# Patient Record
Sex: FEMALE | Race: BLACK OR AFRICAN AMERICAN | NOT HISPANIC OR LATINO | Employment: UNEMPLOYED | URBAN - METROPOLITAN AREA
[De-identification: names, ages, dates, MRNs, and addresses within clinical notes are randomized per-mention and may not be internally consistent; named-entity substitution may affect disease eponyms.]

---

## 2019-12-28 ENCOUNTER — HOSPITAL ENCOUNTER (INPATIENT)
Facility: HOSPITAL | Age: 54
LOS: 5 days | Discharge: HOME | DRG: 418 | End: 2020-01-02
Attending: EMERGENCY MEDICINE | Admitting: SURGERY
Payer: MEDICARE

## 2019-12-28 ENCOUNTER — APPOINTMENT (EMERGENCY)
Dept: RADIOLOGY | Facility: HOSPITAL | Age: 54
DRG: 418 | End: 2019-12-28
Payer: MEDICARE

## 2019-12-28 DIAGNOSIS — K85.10 ACUTE GALLSTONE PANCREATITIS: Primary | ICD-10-CM

## 2019-12-28 DIAGNOSIS — K80.00 CHOLECYSTITIS, ACUTE WITH CHOLELITHIASIS: ICD-10-CM

## 2019-12-28 LAB
ALBUMIN SERPL BCP-MCNC: 3.8 G/DL (ref 3.5–5)
ALP SERPL-CCNC: 88 U/L (ref 46–116)
ALT SERPL W P-5'-P-CCNC: 22 U/L (ref 12–78)
ANION GAP SERPL CALCULATED.3IONS-SCNC: 4 MMOL/L (ref 4–13)
AST SERPL W P-5'-P-CCNC: 18 U/L (ref 5–45)
BASOPHILS # BLD AUTO: 0.02 THOUSANDS/ΜL (ref 0–0.1)
BASOPHILS NFR BLD AUTO: 0 % (ref 0–1)
BILIRUB SERPL-MCNC: 0.23 MG/DL (ref 0.2–1)
BUN SERPL-MCNC: 16 MG/DL (ref 5–25)
CALCIUM SERPL-MCNC: 10 MG/DL (ref 8.3–10.1)
CHLORIDE SERPL-SCNC: 102 MMOL/L (ref 100–108)
CO2 SERPL-SCNC: 29 MMOL/L (ref 21–32)
CREAT SERPL-MCNC: 1.32 MG/DL (ref 0.6–1.3)
EOSINOPHIL # BLD AUTO: 0 THOUSAND/ΜL (ref 0–0.61)
EOSINOPHIL NFR BLD AUTO: 0 % (ref 0–6)
ERYTHROCYTE [DISTWIDTH] IN BLOOD BY AUTOMATED COUNT: 13.6 % (ref 11.6–15.1)
GFR SERPL CREATININE-BSD FRML MDRD: 52 ML/MIN/1.73SQ M
GLUCOSE SERPL-MCNC: 159 MG/DL (ref 65–140)
HCT VFR BLD AUTO: 42.4 % (ref 34.8–46.1)
HGB BLD-MCNC: 13.8 G/DL (ref 11.5–15.4)
IMM GRANULOCYTES # BLD AUTO: 0.05 THOUSAND/UL (ref 0–0.2)
IMM GRANULOCYTES NFR BLD AUTO: 0 % (ref 0–2)
LACTATE SERPL-SCNC: 2.1 MMOL/L (ref 0.5–2)
LIPASE SERPL-CCNC: 2308 U/L (ref 73–393)
LYMPHOCYTES # BLD AUTO: 1.32 THOUSANDS/ΜL (ref 0.6–4.47)
LYMPHOCYTES NFR BLD AUTO: 11 % (ref 14–44)
MCH RBC QN AUTO: 30.7 PG (ref 26.8–34.3)
MCHC RBC AUTO-ENTMCNC: 32.5 G/DL (ref 31.4–37.4)
MCV RBC AUTO: 94 FL (ref 82–98)
MONOCYTES # BLD AUTO: 0.97 THOUSAND/ΜL (ref 0.17–1.22)
MONOCYTES NFR BLD AUTO: 8 % (ref 4–12)
NEUTROPHILS # BLD AUTO: 10.07 THOUSANDS/ΜL (ref 1.85–7.62)
NEUTS SEG NFR BLD AUTO: 81 % (ref 43–75)
NRBC BLD AUTO-RTO: 0 /100 WBCS
PLATELET # BLD AUTO: 208 THOUSANDS/UL (ref 149–390)
PMV BLD AUTO: 10.9 FL (ref 8.9–12.7)
POTASSIUM SERPL-SCNC: 4.8 MMOL/L (ref 3.5–5.3)
PROT SERPL-MCNC: 7.9 G/DL (ref 6.4–8.2)
RBC # BLD AUTO: 4.5 MILLION/UL (ref 3.81–5.12)
SODIUM SERPL-SCNC: 135 MMOL/L (ref 136–145)
WBC # BLD AUTO: 12.43 THOUSAND/UL (ref 4.31–10.16)

## 2019-12-28 PROCEDURE — 99222 1ST HOSP IP/OBS MODERATE 55: CPT | Performed by: SURGERY

## 2019-12-28 PROCEDURE — 93005 ELECTROCARDIOGRAM TRACING: CPT

## 2019-12-28 PROCEDURE — 96361 HYDRATE IV INFUSION ADD-ON: CPT

## 2019-12-28 PROCEDURE — 80053 COMPREHEN METABOLIC PANEL: CPT | Performed by: EMERGENCY MEDICINE

## 2019-12-28 PROCEDURE — 36415 COLL VENOUS BLD VENIPUNCTURE: CPT | Performed by: EMERGENCY MEDICINE

## 2019-12-28 PROCEDURE — 83605 ASSAY OF LACTIC ACID: CPT | Performed by: EMERGENCY MEDICINE

## 2019-12-28 PROCEDURE — 99285 EMERGENCY DEPT VISIT HI MDM: CPT

## 2019-12-28 PROCEDURE — 76705 ECHO EXAM OF ABDOMEN: CPT

## 2019-12-28 PROCEDURE — 96375 TX/PRO/DX INJ NEW DRUG ADDON: CPT

## 2019-12-28 PROCEDURE — 85025 COMPLETE CBC W/AUTO DIFF WBC: CPT | Performed by: EMERGENCY MEDICINE

## 2019-12-28 PROCEDURE — 83690 ASSAY OF LIPASE: CPT | Performed by: EMERGENCY MEDICINE

## 2019-12-28 PROCEDURE — 96374 THER/PROPH/DIAG INJ IV PUSH: CPT

## 2019-12-28 PROCEDURE — 99284 EMERGENCY DEPT VISIT MOD MDM: CPT | Performed by: EMERGENCY MEDICINE

## 2019-12-28 RX ORDER — DIVALPROEX SODIUM 125 MG/1
1000 CAPSULE, COATED PELLETS ORAL
Status: DISCONTINUED | OUTPATIENT
Start: 2019-12-29 | End: 2019-12-29

## 2019-12-28 RX ORDER — QUETIAPINE FUMARATE 100 MG/1
100 TABLET, FILM COATED ORAL
Status: DISCONTINUED | OUTPATIENT
Start: 2019-12-28 | End: 2020-01-02 | Stop reason: HOSPADM

## 2019-12-28 RX ORDER — DIVALPROEX SODIUM 125 MG/1
500 CAPSULE, COATED PELLETS ORAL
Status: DISCONTINUED | OUTPATIENT
Start: 2019-12-28 | End: 2019-12-28

## 2019-12-28 RX ORDER — OXYCODONE HYDROCHLORIDE 10 MG/1
10 TABLET ORAL EVERY 4 HOURS PRN
Status: DISCONTINUED | OUTPATIENT
Start: 2019-12-28 | End: 2020-01-02 | Stop reason: HOSPADM

## 2019-12-28 RX ORDER — ONDANSETRON 2 MG/ML
4 INJECTION INTRAMUSCULAR; INTRAVENOUS EVERY 6 HOURS PRN
Status: DISCONTINUED | OUTPATIENT
Start: 2019-12-28 | End: 2020-01-02 | Stop reason: HOSPADM

## 2019-12-28 RX ORDER — QUETIAPINE FUMARATE 100 MG/1
100 TABLET, FILM COATED ORAL
COMMUNITY

## 2019-12-28 RX ORDER — KETOROLAC TROMETHAMINE 30 MG/ML
15 INJECTION, SOLUTION INTRAMUSCULAR; INTRAVENOUS ONCE
Status: COMPLETED | OUTPATIENT
Start: 2019-12-28 | End: 2019-12-28

## 2019-12-28 RX ORDER — DIVALPROEX SODIUM 125 MG/1
500 CAPSULE, COATED PELLETS ORAL DAILY
Status: DISCONTINUED | OUTPATIENT
Start: 2019-12-29 | End: 2019-12-29

## 2019-12-28 RX ORDER — ACETAMINOPHEN 325 MG/1
650 TABLET ORAL EVERY 6 HOURS PRN
Status: DISCONTINUED | OUTPATIENT
Start: 2019-12-28 | End: 2020-01-02 | Stop reason: HOSPADM

## 2019-12-28 RX ORDER — DIVALPROEX SODIUM 125 MG/1
500 CAPSULE, COATED PELLETS ORAL DAILY
Status: DISCONTINUED | OUTPATIENT
Start: 2019-12-29 | End: 2019-12-28

## 2019-12-28 RX ORDER — OXYCODONE HYDROCHLORIDE 5 MG/1
5 TABLET ORAL EVERY 4 HOURS PRN
Status: DISCONTINUED | OUTPATIENT
Start: 2019-12-28 | End: 2020-01-02 | Stop reason: HOSPADM

## 2019-12-28 RX ORDER — AMLODIPINE BESYLATE 10 MG/1
10 TABLET ORAL DAILY
COMMUNITY

## 2019-12-28 RX ORDER — HEPARIN SODIUM 5000 [USP'U]/ML
5000 INJECTION, SOLUTION INTRAVENOUS; SUBCUTANEOUS EVERY 8 HOURS SCHEDULED
Status: DISCONTINUED | OUTPATIENT
Start: 2019-12-28 | End: 2020-01-02 | Stop reason: HOSPADM

## 2019-12-28 RX ORDER — HYDROMORPHONE HCL/PF 1 MG/ML
1 SYRINGE (ML) INJECTION
Status: DISCONTINUED | OUTPATIENT
Start: 2019-12-28 | End: 2020-01-02 | Stop reason: HOSPADM

## 2019-12-28 RX ORDER — SODIUM CHLORIDE 9 MG/ML
50 INJECTION, SOLUTION INTRAVENOUS CONTINUOUS
Status: DISCONTINUED | OUTPATIENT
Start: 2019-12-28 | End: 2020-01-01

## 2019-12-28 RX ORDER — DIVALPROEX SODIUM 125 MG/1
500 CAPSULE, COATED PELLETS ORAL DAILY
COMMUNITY

## 2019-12-28 RX ORDER — ONDANSETRON 2 MG/ML
4 INJECTION INTRAMUSCULAR; INTRAVENOUS ONCE
Status: COMPLETED | OUTPATIENT
Start: 2019-12-28 | End: 2019-12-28

## 2019-12-28 RX ORDER — DIVALPROEX SODIUM 125 MG/1
1000 CAPSULE, COATED PELLETS ORAL
COMMUNITY

## 2019-12-28 RX ADMIN — SODIUM CHLORIDE 1000 ML: 0.9 INJECTION, SOLUTION INTRAVENOUS at 20:13

## 2019-12-28 RX ADMIN — SODIUM CHLORIDE 150 ML/HR: 0.9 INJECTION, SOLUTION INTRAVENOUS at 23:52

## 2019-12-28 RX ADMIN — ONDANSETRON 4 MG: 2 INJECTION INTRAMUSCULAR; INTRAVENOUS at 20:13

## 2019-12-28 RX ADMIN — KETOROLAC TROMETHAMINE 15 MG: 30 INJECTION, SOLUTION INTRAMUSCULAR at 20:13

## 2019-12-29 LAB
ALBUMIN SERPL BCP-MCNC: 2.7 G/DL (ref 3.5–5)
ALP SERPL-CCNC: 66 U/L (ref 46–116)
ALT SERPL W P-5'-P-CCNC: 16 U/L (ref 12–78)
ANION GAP SERPL CALCULATED.3IONS-SCNC: 4 MMOL/L (ref 4–13)
AST SERPL W P-5'-P-CCNC: 12 U/L (ref 5–45)
ATRIAL RATE: 105 BPM
BASOPHILS # BLD AUTO: 0.02 THOUSANDS/ΜL (ref 0–0.1)
BASOPHILS NFR BLD AUTO: 0 % (ref 0–1)
BILIRUB DIRECT SERPL-MCNC: 0.1 MG/DL (ref 0–0.2)
BILIRUB SERPL-MCNC: 0.32 MG/DL (ref 0.2–1)
BUN SERPL-MCNC: 16 MG/DL (ref 5–25)
CALCIUM SERPL-MCNC: 9.1 MG/DL (ref 8.3–10.1)
CHLORIDE SERPL-SCNC: 109 MMOL/L (ref 100–108)
CO2 SERPL-SCNC: 27 MMOL/L (ref 21–32)
CREAT SERPL-MCNC: 1.14 MG/DL (ref 0.6–1.3)
EOSINOPHIL # BLD AUTO: 0.01 THOUSAND/ΜL (ref 0–0.61)
EOSINOPHIL NFR BLD AUTO: 0 % (ref 0–6)
ERYTHROCYTE [DISTWIDTH] IN BLOOD BY AUTOMATED COUNT: 13.7 % (ref 11.6–15.1)
GFR SERPL CREATININE-BSD FRML MDRD: 62 ML/MIN/1.73SQ M
GLUCOSE SERPL-MCNC: 99 MG/DL (ref 65–140)
HCT VFR BLD AUTO: 35.7 % (ref 34.8–46.1)
HGB BLD-MCNC: 11.3 G/DL (ref 11.5–15.4)
IMM GRANULOCYTES # BLD AUTO: 0.01 THOUSAND/UL (ref 0–0.2)
IMM GRANULOCYTES NFR BLD AUTO: 0 % (ref 0–2)
LACTATE SERPL-SCNC: 1.3 MMOL/L (ref 0.5–2)
LIPASE SERPL-CCNC: 1212 U/L (ref 73–393)
LYMPHOCYTES # BLD AUTO: 2.18 THOUSANDS/ΜL (ref 0.6–4.47)
LYMPHOCYTES NFR BLD AUTO: 29 % (ref 14–44)
MCH RBC QN AUTO: 30.1 PG (ref 26.8–34.3)
MCHC RBC AUTO-ENTMCNC: 31.7 G/DL (ref 31.4–37.4)
MCV RBC AUTO: 95 FL (ref 82–98)
MONOCYTES # BLD AUTO: 1.13 THOUSAND/ΜL (ref 0.17–1.22)
MONOCYTES NFR BLD AUTO: 15 % (ref 4–12)
NEUTROPHILS # BLD AUTO: 4.24 THOUSANDS/ΜL (ref 1.85–7.62)
NEUTS SEG NFR BLD AUTO: 56 % (ref 43–75)
NRBC BLD AUTO-RTO: 0 /100 WBCS
P AXIS: 68 DEGREES
PLATELET # BLD AUTO: 157 THOUSANDS/UL (ref 149–390)
PMV BLD AUTO: 10.6 FL (ref 8.9–12.7)
POTASSIUM SERPL-SCNC: 4.4 MMOL/L (ref 3.5–5.3)
PR INTERVAL: 128 MS
PROT SERPL-MCNC: 6.2 G/DL (ref 6.4–8.2)
QRS AXIS: 8 DEGREES
QRSD INTERVAL: 66 MS
QT INTERVAL: 324 MS
QTC INTERVAL: 428 MS
RBC # BLD AUTO: 3.75 MILLION/UL (ref 3.81–5.12)
SODIUM SERPL-SCNC: 140 MMOL/L (ref 136–145)
T WAVE AXIS: 66 DEGREES
VENTRICULAR RATE: 105 BPM
WBC # BLD AUTO: 7.59 THOUSAND/UL (ref 4.31–10.16)

## 2019-12-29 PROCEDURE — 99232 SBSQ HOSP IP/OBS MODERATE 35: CPT | Performed by: SURGERY

## 2019-12-29 PROCEDURE — 93010 ELECTROCARDIOGRAM REPORT: CPT | Performed by: INTERNAL MEDICINE

## 2019-12-29 PROCEDURE — 80048 BASIC METABOLIC PNL TOTAL CA: CPT | Performed by: SURGERY

## 2019-12-29 PROCEDURE — 83690 ASSAY OF LIPASE: CPT | Performed by: SURGERY

## 2019-12-29 PROCEDURE — 83605 ASSAY OF LACTIC ACID: CPT | Performed by: SURGERY

## 2019-12-29 PROCEDURE — 85025 COMPLETE CBC W/AUTO DIFF WBC: CPT | Performed by: SURGERY

## 2019-12-29 PROCEDURE — 80076 HEPATIC FUNCTION PANEL: CPT | Performed by: SURGERY

## 2019-12-29 RX ORDER — DIVALPROEX SODIUM 125 MG/1
500 CAPSULE, COATED PELLETS ORAL DAILY
Status: DISCONTINUED | OUTPATIENT
Start: 2019-12-29 | End: 2020-01-02 | Stop reason: HOSPADM

## 2019-12-29 RX ORDER — DIVALPROEX SODIUM 125 MG/1
1000 CAPSULE, COATED PELLETS ORAL
Status: DISCONTINUED | OUTPATIENT
Start: 2019-12-29 | End: 2020-01-02 | Stop reason: HOSPADM

## 2019-12-29 RX ADMIN — OXYCODONE HYDROCHLORIDE 5 MG: 5 TABLET ORAL at 19:49

## 2019-12-29 RX ADMIN — SODIUM CHLORIDE 150 ML/HR: 0.9 INJECTION, SOLUTION INTRAVENOUS at 06:24

## 2019-12-29 RX ADMIN — ACETAMINOPHEN 650 MG: 325 TABLET ORAL at 17:18

## 2019-12-29 RX ADMIN — DIVALPROEX SODIUM 500 MG: 125 CAPSULE, COATED PELLETS ORAL at 08:41

## 2019-12-29 RX ADMIN — HEPARIN SODIUM 5000 UNITS: 5000 INJECTION INTRAVENOUS; SUBCUTANEOUS at 22:30

## 2019-12-29 RX ADMIN — QUETIAPINE FUMARATE 100 MG: 100 TABLET ORAL at 00:13

## 2019-12-29 RX ADMIN — HEPARIN SODIUM 5000 UNITS: 5000 INJECTION INTRAVENOUS; SUBCUTANEOUS at 00:12

## 2019-12-29 RX ADMIN — QUETIAPINE FUMARATE 100 MG: 100 TABLET ORAL at 22:30

## 2019-12-29 RX ADMIN — SODIUM CHLORIDE 125 ML/HR: 0.9 INJECTION, SOLUTION INTRAVENOUS at 22:34

## 2019-12-29 RX ADMIN — HEPARIN SODIUM 5000 UNITS: 5000 INJECTION INTRAVENOUS; SUBCUTANEOUS at 06:24

## 2019-12-29 RX ADMIN — DIVALPROEX SODIUM 1000 MG: 125 CAPSULE, COATED PELLETS ORAL at 22:30

## 2019-12-29 RX ADMIN — HEPARIN SODIUM 5000 UNITS: 5000 INJECTION INTRAVENOUS; SUBCUTANEOUS at 13:07

## 2019-12-29 RX ADMIN — OXYCODONE HYDROCHLORIDE 10 MG: 10 TABLET ORAL at 00:12

## 2019-12-29 RX ADMIN — OXYCODONE HYDROCHLORIDE 5 MG: 5 TABLET ORAL at 08:40

## 2019-12-29 RX ADMIN — SODIUM CHLORIDE 150 ML/HR: 0.9 INJECTION, SOLUTION INTRAVENOUS at 13:10

## 2019-12-29 RX ADMIN — OXYCODONE HYDROCHLORIDE 5 MG: 5 TABLET ORAL at 13:08

## 2019-12-29 RX ADMIN — DIVALPROEX SODIUM 1000 MG: 125 CAPSULE, COATED PELLETS ORAL at 00:21

## 2019-12-29 NOTE — PLAN OF CARE
Problem: PAIN - ADULT  Goal: Verbalizes/displays adequate comfort level or baseline comfort level  Description  Interventions:  - Encourage patient to monitor pain and request assistance  - Assess pain using appropriate pain scale  - Administer analgesics based on type and severity of pain and evaluate response  - Implement non-pharmacological measures as appropriate and evaluate response  - Consider cultural and social influences on pain and pain management  - Notify physician/advanced practitioner if interventions unsuccessful or patient reports new pain  Outcome: Progressing     Problem: INFECTION - ADULT  Goal: Absence or prevention of progression during hospitalization  Description  INTERVENTIONS:  - Assess and monitor for signs and symptoms of infection  - Monitor lab/diagnostic results  - Monitor all insertion sites, i e  indwelling lines, tubes, and drains  - Monitor endotracheal if appropriate and nasal secretions for changes in amount and color  - Mathews appropriate cooling/warming therapies per order  - Administer medications as ordered  - Instruct and encourage patient and family to use good hand hygiene technique  - Identify and instruct in appropriate isolation precautions for identified infection/condition  Outcome: Progressing  Goal: Absence of fever/infection during neutropenic period  Description  INTERVENTIONS:  - Monitor WBC    Outcome: Progressing     Problem: SAFETY ADULT  Goal: Patient will remain free of falls  Description  INTERVENTIONS:  - Assess patient frequently for physical needs  -  Identify cognitive and physical deficits and behaviors that affect risk of falls    -  Mathews fall precautions as indicated by assessment   - Educate patient/family on patient safety including physical limitations  - Instruct patient to call for assistance with activity based on assessment  - Modify environment to reduce risk of injury  - Consider OT/PT consult to assist with strengthening/mobility  Outcome: Progressing  Goal: Maintain or return to baseline ADL function  Description  INTERVENTIONS:  -  Assess patient's ability to carry out ADLs; assess patient's baseline for ADL function and identify physical deficits which impact ability to perform ADLs (bathing, care of mouth/teeth, toileting, grooming, dressing, etc )  - Assess/evaluate cause of self-care deficits   - Assess range of motion  - Assess patient's mobility; develop plan if impaired  - Assess patient's need for assistive devices and provide as appropriate  - Encourage maximum independence but intervene and supervise when necessary  - Involve family in performance of ADLs  - Assess for home care needs following discharge   - Consider OT consult to assist with ADL evaluation and planning for discharge  - Provide patient education as appropriate  Outcome: Progressing  Goal: Maintain or return mobility status to optimal level  Description  INTERVENTIONS:  - Assess patient's baseline mobility status (ambulation, transfers, stairs, etc )    - Identify cognitive and physical deficits and behaviors that affect mobility  - Identify mobility aids required to assist with transfers and/or ambulation (gait belt, sit-to-stand, lift, walker, cane, etc )  - [place]om fall precautions as indicated by assessment  - Record patient progress and toleration of activity level on Mobility SBAR; progress patient to next Phase/Stage  - Instruct patient to call for assistance with activity based on assessment  - Consider rehabilitation consult to assist with strengthening/weightbearing, etc   Outcome: Progressing     Problem: DISCHARGE PLANNING  Goal: Discharge to home or other facility with appropriate resources  Description  INTERVENTIONS:  - Identify barriers to discharge w/patient and caregiver  - Arrange for needed discharge resources and transportation as appropriate  - Identify discharge learning needs (meds, wound care, etc )  - Arrange for interpretive services to assist at discharge as needed  - Refer to Case Management Department for coordinating discharge planning if the patient needs post-hospital services based on physician/advanced practitioner order or complex needs related to functional status, cognitive ability, or social support system  Outcome: Progressing     Problem: Knowledge Deficit  Goal: Patient/family/caregiver demonstrates understanding of disease process, treatment plan, medications, and discharge instructions  Description  Complete learning assessment and assess knowledge base    Interventions:  - Provide teaching at level of understanding  - Provide teaching via preferred learning methods  Outcome: Progressing     Problem: GASTROINTESTINAL - ADULT  Goal: Minimal or absence of nausea and/or vomiting  Description  INTERVENTIONS:  - Administer IV fluids if ordered to ensure adequate hydration  - Maintain NPO status until nausea and vomiting are resolved  - Nasogastric tube if ordered  - Administer ordered antiemetic medications as needed  - Provide nonpharmacologic comfort measures as appropriate  - Advance diet as tolerated, if ordered  - Consider nutrition services referral to assist patient with adequate nutrition and appropriate food choices  Outcome: Progressing  Goal: Maintains or returns to baseline bowel function  Description  INTERVENTIONS:  - Assess bowel function  - Encourage oral fluids to ensure adequate hydration  - Administer IV fluids if ordered to ensure adequate hydration  - Administer ordered medications as needed  - Encourage mobilization and activity  - Consider nutritional services referral to assist patient with adequate nutrition and appropriate food choices  Outcome: Progressing  Goal: Maintains adequate nutritional intake  Description  INTERVENTIONS:  - Monitor percentage of each meal consumed  - Identify factors contributing to decreased intake, treat as appropriate  - Assist with meals as needed  - Monitor I&O, weight, and lab values if indicated  - Obtain nutrition services referral as needed  Outcome: Progressing

## 2019-12-29 NOTE — ED NOTES
Per surgical resident, not necessary to draw repeat lactic at this time     Crystal Cooney, RN  12/28/19 1244

## 2019-12-29 NOTE — ED PROVIDER NOTES
History  Chief Complaint   Patient presents with    Abdominal Pain     pt reports abdominal pain that is "stabbing"  states she is nauseous     Patient presents for evaluation of abdominal pain  Patient states that she woke up this morning and she had a stabbing pain located mostly in her epigastric and right upper quadrant, occasional radiations back to her right flank  She has had nausea without any episodes of vomiting  Denies fever/chills, diarrhea  Feels that she has had some constipation with only very small bowel movements  She has had as a history of gallbladder is but have not had an issue with it for the past couple of years so she thought that it was likely resolved  Denies any relieving or exacerbating factors, no change with sensation with food intake  No recent travel, sick contacts  She had never followed up with any further evaluation of gallbladder pathology  She otherwise is her baseline health  Prior to Admission Medications   Prescriptions Last Dose Informant Patient Reported? Taking? QUEtiapine (SEROquel) 100 mg tablet   Yes Yes   Sig: Take 100 mg by mouth daily at bedtime   amLODIPine (NORVASC) 10 mg tablet   Yes Yes   Sig: Take 10 mg by mouth daily   divalproex sodium (DEPAKOTE SPRINKLE) 125 MG capsule   Yes Yes   Sig: Take 500 mg by mouth daily   divalproex sodium (DEPAKOTE SPRINKLE) 125 MG capsule   Yes Yes   Sig: Take 1,000 mg by mouth daily at bedtime      Facility-Administered Medications: None       Past Medical History:   Diagnosis Date    Depression     Hypertension     Prediabetes        Past Surgical History:   Procedure Laterality Date    UTERINE FIBROID SURGERY         History reviewed  No pertinent family history  I have reviewed and agree with the history as documented      Social History     Tobacco Use    Smoking status: Former Smoker    Smokeless tobacco: Never Used   Substance Use Topics    Alcohol use: Never     Frequency: Never    Drug use: Never        Review of Systems   Constitutional: Negative for chills, diaphoresis, fatigue and fever  Respiratory: Negative for cough and shortness of breath  Cardiovascular: Negative for chest pain and palpitations  Gastrointestinal: Positive for abdominal pain  Negative for abdominal distention, constipation, diarrhea, nausea and vomiting  Genitourinary: Negative for dysuria, frequency and hematuria  Musculoskeletal: Negative for arthralgias, myalgias and neck pain  Neurological: Negative for dizziness, syncope, light-headedness and headaches  All other systems reviewed and are negative  Physical Exam  ED Triage Vitals   Temperature Pulse Respirations Blood Pressure SpO2   12/28/19 2030 12/28/19 2000 12/28/19 2000 12/28/19 2000 12/28/19 2000   98 2 °F (36 8 °C) 104 19 168/73 98 %      Temp Source Heart Rate Source Patient Position - Orthostatic VS BP Location FiO2 (%)   12/28/19 2030 -- 12/30/19 2344 12/30/19 2344 --   Oral  Lying Right arm       Pain Score       12/28/19 2052       3             Orthostatic Vital Signs  Vitals:    12/31/19 0910 12/31/19 0920 12/31/19 0930 12/31/19 1511   BP:    152/82   Pulse: 98 96 95 98   Patient Position - Orthostatic VS:           Physical Exam   Constitutional: She is oriented to person, place, and time  She appears well-nourished  No distress  HENT:   Head: Normocephalic and atraumatic  Right Ear: External ear normal    Left Ear: External ear normal    Mouth/Throat: Oropharynx is clear and moist    Eyes: Pupils are equal, round, and reactive to light  Conjunctivae and EOM are normal  Right eye exhibits no discharge  Left eye exhibits no discharge  No scleral icterus  Neck: Normal range of motion  Neck supple  No JVD present  Cardiovascular: Normal rate, regular rhythm, normal heart sounds and intact distal pulses  Exam reveals no gallop and no friction rub  No murmur heard    Pulmonary/Chest: Effort normal and breath sounds normal  No respiratory distress  She has no wheezes  She has no rales  Abdominal: Soft  Bowel sounds are normal  She exhibits no distension and no mass  There is tenderness in the right upper quadrant and epigastric area  There is no rigidity, no rebound, no guarding and negative Villalobos's sign  Musculoskeletal: Normal range of motion  She exhibits no tenderness or deformity  Neurological: She is alert and oriented to person, place, and time  No cranial nerve deficit or sensory deficit  She exhibits normal muscle tone  Coordination normal    Skin: Skin is warm  She is not diaphoretic  Psychiatric: She has a normal mood and affect  Her behavior is normal  Judgment and thought content normal    Vitals reviewed        ED Medications  Medications   QUEtiapine (SEROquel) tablet 100 mg (100 mg Oral Given 12/30/19 2158)   sodium chloride 0 9 % infusion (50 mL/hr Intravenous Rate/Dose Change 12/31/19 0935)   ondansetron (ZOFRAN) injection 4 mg (has no administration in time range)   heparin (porcine) subcutaneous injection 5,000 Units (5,000 Units Subcutaneous Given 12/31/19 1412)   acetaminophen (TYLENOL) tablet 650 mg (650 mg Oral Given 12/30/19 0846)   oxyCODONE (ROXICODONE) immediate release tablet 10 mg (10 mg Oral Given 12/29/19 0012)   oxyCODONE (ROXICODONE) IR tablet 5 mg (5 mg Oral Given 12/31/19 1412)   HYDROmorphone (DILAUDID) injection 1 mg (has no administration in time range)   divalproex sodium (DEPAKOTE SPRINKLE) capsule 1,000 mg (1,000 mg Oral Given 12/30/19 2158)   divalproex sodium (DEPAKOTE SPRINKLE) capsule 500 mg (500 mg Oral Given 12/31/19 0904)   dextrose 5 % and sodium chloride 0 45 % with KCl 20 mEq/L infusion (has no administration in time range)   ceFAZolin (ANCEF) IVPB (premix) 2,000 mg (has no administration in time range)   sodium chloride 0 9 % bolus 1,000 mL (0 mL Intravenous Stopped 12/28/19 2218)   ondansetron (ZOFRAN) injection 4 mg (4 mg Intravenous Given 12/28/19 2013)   ketorolac (TORADOL) injection 15 mg (15 mg Intravenous Given 12/28/19 2013)       Diagnostic Studies  Results Reviewed     Procedure Component Value Units Date/Time    Hepatic function panel [[id number]]  (Abnormal) Collected:  12/29/19 0441    Lab Status:  Final result Specimen:  Blood from Arm, Right Updated:  12/29/19 0521     Total Bilirubin 0 32 mg/dL      Bilirubin, Direct 0 10 mg/dL      Alkaline Phosphatase 66 U/L      AST 12 U/L      ALT 16 U/L      Total Protein 6 2 g/dL      Albumin 2 7 g/dL     Basic metabolic panel [[id number]]  (Abnormal) Collected:  12/29/19 0441    Lab Status:  Final result Specimen:  Blood from Arm, Right Updated:  12/29/19 0517     Sodium 140 mmol/L      Potassium 4 4 mmol/L      Chloride 109 mmol/L      CO2 27 mmol/L      ANION GAP 4 mmol/L      BUN 16 mg/dL      Creatinine 1 14 mg/dL      Glucose 99 mg/dL      Calcium 9 1 mg/dL      eGFR 62 ml/min/1 73sq m     Narrative:       [place] guidelines for Chronic Kidney Disease (CKD):     Stage 1 with normal or high GFR (GFR > 90 mL/min/1 73 square meters)    Stage 2 Mild CKD (GFR = 60-89 mL/min/1 73 square meters)    Stage 3A Moderate CKD (GFR = 45-59 mL/min/1 73 square meters)    Stage 3B Moderate CKD (GFR = 30-44 mL/min/1 73 square meters)    Stage 4 Severe CKD (GFR = 15-29 mL/min/1 73 square meters)    Stage 5 End Stage CKD (GFR <15 mL/min/1 73 square meters)  Note: GFR calculation is accurate only with a steady state creatinine    Lactic acid, plasma [[id number]]  (Normal) Collected:  12/29/19 0441    Lab Status:  Final result Specimen:  Blood from Arm, Right Updated:  12/29/19 0517     LACTIC ACID 1 3 mmol/L     Narrative:       Result may be elevated if tourniquet was used during collection      Lipase [[id number]]  (Abnormal) Collected:  12/29/19 0441    Lab Status:  Final result Specimen:  Blood from Arm, Right Updated:  12/29/19 0517     Lipase 1,212 u/L     CBC and differential [[id number]]  (Abnormal) Collected: 12/29/19 0441    Lab Status:  Final result Specimen:  Blood from Arm, Right Updated:  12/29/19 0458     WBC 7 59 Thousand/uL      RBC 3 75 Million/uL      Hemoglobin 11 3 g/dL      Hematocrit 35 7 %      MCV 95 fL      MCH 30 1 pg      MCHC 31 7 g/dL      RDW 13 7 %      MPV 10 6 fL      Platelets 583 Thousands/uL      nRBC 0 /100 WBCs      Neutrophils Relative 56 %      Immat GRANS % 0 %      Lymphocytes Relative 29 %      Monocytes Relative 15 %      Eosinophils Relative 0 %      Basophils Relative 0 %      Neutrophils Absolute 4 24 Thousands/µL      Immature Grans Absolute 0 01 Thousand/uL      Lymphocytes Absolute 2 18 Thousands/µL      Monocytes Absolute 1 13 Thousand/µL      Eosinophils Absolute 0 01 Thousand/µL      Basophils Absolute 0 02 Thousands/µL     Platelet count [[id number]]     Lab Status:  No result Specimen:  Blood     Lactic acid, plasma [[id number]]  (Abnormal) Collected:  12/28/19 2051    Lab Status:  Final result Specimen:  Blood from Arm, Left Updated:  12/28/19 2133     LACTIC ACID 2 1 mmol/L     Narrative:       Result may be elevated if tourniquet was used during collection      Lipase [[id number]]  (Abnormal) Collected:  12/28/19 2011    Lab Status:  Final result Specimen:  Blood from Arm, Left Updated:  12/28/19 2109     Lipase 2,308 u/L     Comprehensive metabolic panel [[id number]]  (Abnormal) Collected:  12/28/19 2011    Lab Status:  Final result Specimen:  Blood from Arm, Left Updated:  12/28/19 2106     Sodium 135 mmol/L      Potassium 4 8 mmol/L      Chloride 102 mmol/L      CO2 29 mmol/L      ANION GAP 4 mmol/L      BUN 16 mg/dL      Creatinine 1 32 mg/dL      Glucose 159 mg/dL      Calcium 10 0 mg/dL      AST 18 U/L      ALT 22 U/L      Alkaline Phosphatase 88 U/L      Total Protein 7 9 g/dL      Albumin 3 8 g/dL      Total Bilirubin 0 23 mg/dL      eGFR 52 ml/min/1 73sq m     Narrative:       [place] guidelines for Chronic Kidney Disease (CKD):     Stage 1 with normal or high GFR (GFR > 90 mL/min/1 73 square meters)    Stage 2 Mild CKD (GFR = 60-89 mL/min/1 73 square meters)    Stage 3A Moderate CKD (GFR = 45-59 mL/min/1 73 square meters)    Stage 3B Moderate CKD (GFR = 30-44 mL/min/1 73 square meters)    Stage 4 Severe CKD (GFR = 15-29 mL/min/1 73 square meters)    Stage 5 End Stage CKD (GFR <15 mL/min/1 73 square meters)  Note: GFR calculation is accurate only with a steady state creatinine    CBC and differential [[id number]]  (Abnormal) Collected:  12/28/19 2011    Lab Status:  Final result Specimen:  Blood from Arm, Left Updated:  12/28/19 2022     WBC 12 43 Thousand/uL      RBC 4 50 Million/uL      Hemoglobin 13 8 g/dL      Hematocrit 42 4 %      MCV 94 fL      MCH 30 7 pg      MCHC 32 5 g/dL      RDW 13 6 %      MPV 10 9 fL      Platelets 628 Thousands/uL      nRBC 0 /100 WBCs      Neutrophils Relative 81 %      Immat GRANS % 0 %      Lymphocytes Relative 11 %      Monocytes Relative 8 %      Eosinophils Relative 0 %      Basophils Relative 0 %      Neutrophils Absolute 10 07 Thousands/µL      Immature Grans Absolute 0 05 Thousand/uL      Lymphocytes Absolute 1 32 Thousands/µL      Monocytes Absolute 0 97 Thousand/µL      Eosinophils Absolute 0 00 Thousand/µL      Basophils Absolute 0 02 Thousands/µL                  MRI abdomen wo contrast and mrcp   Final Result by Edgar Licea MD (12/31 1822)      Peripancreatic edema in keeping with acute interstitial pancreatitis  No main pancreatic ductal dilation  No peripancreatic fluid collection  Cholelithiasis without findings of acute cholecystitis  Central intrahepatic and CBD dilation (diameter of 1 3 cm) with smooth distal tapering  No choledocholithiasis, biliary stricture, or suspicious mass                       Workstation performed: [id number]         US right upper quadrant   Final Result by Nikhil Osorio DO (12/28 2229)   A small amount of hypoechoic peripancreatic fluid is noted, highly suspicious for acute interstitial pancreatitis in the appropriate clinical setting; correlation with the patient's symptoms and laboratory values recommended  Cholelithiasis without discrete sonographic evidence of acute cholecystitis  Prominent common bile duct which measures approximately 11 mm in diameter but no discrete choledocholithiasis or intrahepatic biliary ductal dilatation is seen  Other findings as above  Workstation performed: [id number]               Procedures  Procedures      ED Course  ED Course as of Dec 31 1619   Sat Dec 28, 2019   2110 WBC(!): 12 43   2110 Lipase(!): 2,308   2137 LACTIC ACID(!!): 2 1                               MDM  Number of Diagnoses or Management Options  Diagnosis management comments: Bedside ultrasound revealed a dilated gallbladder with many stones  Surgery was consulted and admitted the patient for further management  Disposition  Final diagnoses:   Acute gallstone pancreatitis     Time reflects when diagnosis was documented in both MDM as applicable and the Disposition within this note     Time User Action Codes Description Comment    12/31/2019 12:46 PM Hanh Plaza Add [K85 10] Acute gallstone pancreatitis     12/31/2019 12:46 PM Hanh Plaza Modify [X02 98] Acute gallstone pancreatitis       ED Disposition     ED Disposition Condition Date/Time Comment    Admit Stable Sat Dec 28, 2019  9:51 PM Case was discussed with surgery and the patient's admission status was agreed to be Admission Status: inpatient status to the service of Dr Melvi Aparicio           Follow-up Information    None         Current Discharge Medication List      CONTINUE these medications which have NOT CHANGED    Details   amLODIPine (NORVASC) 10 mg tablet Take 10 mg by mouth daily      !! divalproex sodium (DEPAKOTE SPRINKLE) 125 MG capsule Take 500 mg by mouth daily      !! divalproex sodium (DEPAKOTE SPRINKLE) 125 MG capsule Take 1,000 mg by mouth daily at bedtime      QUEtiapine (SEROquel) 100 mg tablet Take 100 mg by mouth daily at bedtime       !! - Potential duplicate medications found  Please discuss with provider  No discharge procedures on file  ED Provider  Attending physically available and evaluated [place]  I managed the patient along with the ED Attending      Electronically Signed by         Malinda Corona MD  12/31/19 5022

## 2019-12-29 NOTE — ED ATTENDING ATTESTATION
12/28/2019  IWes MD, saw and evaluated the patient  I have discussed the patient with the resident/non-physician practitioner and agree with the resident's/non-physician practitioner's findings, Plan of Care, and MDM as documented in the resident's/non-physician practitioner's note, except where noted  All available labs and Radiology studies were reviewed  I was present for key portions of any procedure(s) performed by the resident/non-physician practitioner and I was immediately available to provide assistance  At this point I agree with the current assessment done in the Emergency Department  I have conducted an independent evaluation of this patient a history and physical is as follows:  61-year-old woman presenting with abdominal pain  Patient states that she woke up this morning with severe stabbing abdominal pain  Patient has had pain like this in the past, which she attributed to her gallbladder  Patient states did give her problems for years, and so she did not have it followed up  Patient states the pain has been continuous all throughout the day  She has felt nauseated, but has not vomited  She feels constipated  The pain radiates into her flank  The patient denies shortness of breath  It is not pleuritic  She denies chest pain, lateralizing limb edema, or PE risk factors  She has no urinary symptoms  Review of systems negative in 12 systems reviewed  On exam the patient appears uncomfortable  She is tachycardic  She is not jaundiced  Her pupils are round reactive to light  Extraocular movements are intact  Her mucous membranes are slightly tacky  Neck is supple and nontender with no adenopathy  Heart is regular without murmurs, rubs, gallops  Lungs are clear bilaterally with good air movement  Her abdomen is soft with epigastric and right upper quadrant tenderness  She also has mild left upper quadrant tenderness  She does not have any guarding    I cannot appreciate any masses  She has no lower abdominal tenderness  She does not have any CVA tenderness  She is neurovascularly intact distally, with normal neurological and back and skin exams  Impression:  Cholecystitis versus cholangitis verses pancreatitis  Bedside ultrasound with dilated gallbladder with thickened wall and pericholecystic fluid  Impression:  Cholecystitis    Will plan to check labs, analgesics, antiemetics, will consult surgery  ED Course         Critical Care Time  Procedures

## 2019-12-29 NOTE — PROGRESS NOTES
Progress Note - Salvador Spring 1965, 62 y o  female MRN: [id number]    [facility] Encounter: [id number]    Primary Care Provider: No primary care provider on file  Date and time admitted to hospital: 12/28/2019  7:45 PM        * Acute gallstone pancreatitis  Assessment & Plan  61 y/o F w/ hx of T2DM, gallstones, schizophrenia w/ multiple inpatient psychiatric admissions, now p/w diffuse abdominal pain x 1 day, elevated lipase and lactate, likely acute gallstone pancreatitis     --NPO  --NS @150, 1L NS bolus given in ED  --Pain control  --Eventual cholecystectomy this admission, may require MRCP/intraoperative cholangiogram given dilated CBD  --Continue home Depakote, Seroquel        Subjective/Objective     Subjective:     No acute events overnight  Pt reports her abdominal pain has much improved this AM      Objective:     Blood pressure 137/87, pulse 104, temperature 98 6 °F (37 °C), resp  rate 18, height 5' 5" (1 651 m), weight 77 1 kg (170 lb), SpO2 99 %  ,Body mass index is 28 29 kg/m²  Intake/Output Summary (Last 24 hours) at 12/29/2019 0526  Last data filed at 12/28/2019 2218  Gross per 24 hour   Intake 1000 ml   Output --   Net 1000 ml       Invasive Devices     Peripheral Intravenous Line            Peripheral IV 12/28/19 Left Antecubital less than 1 day                Physical Exam:    GEN: NAD  HEENT: MMM  CV: RRR  Lung: normal effort  Ab: Soft, NT/ND  Extrem: No CCE  Neuro:  A+Ox3, motor and sensation grossly intact    Lab, Imaging and other studies:  CBC:   Lab Results   Component Value Date    WBC 7 59 12/29/2019    HGB 11 3 (L) 12/29/2019    HCT 35 7 12/29/2019    MCV 95 12/29/2019     12/29/2019    MCH 30 1 12/29/2019    MCHC 31 7 12/29/2019    RDW 13 7 12/29/2019    MPV 10 6 12/29/2019    NRBC 0 12/29/2019   , CMP:   Lab Results   Component Value Date    SODIUM 140 12/29/2019    K 4 4 12/29/2019     (H) 12/29/2019    CO2 27 12/29/2019    BUN 16 12/29/2019 CREATININE 1 14 12/29/2019    CALCIUM 9 1 12/29/2019    AST 12 12/29/2019    ALT 16 12/29/2019    ALKPHOS 66 12/29/2019    EGFR 62 12/29/2019   , Coagulation: No results found for: PT, INR, APTT, Urinalysis: No results found for: Joneen Means, SPECGRAV, PHUR, LEUKOCYTESUR, NITRITE, PROTEINUA, GLUCOSEU, KETONESU, BILIRUBINUR, BLOODU, Amylase: No results found for: AMYLASE, Lipase:   Lab Results   Component Value Date    LIPASE 1,212 (H) 12/29/2019     VTE Pharmacologic Prophylaxis: Heparin  VTE Mechanical Prophylaxis: sequential compression device

## 2019-12-29 NOTE — ASSESSMENT & PLAN NOTE
63 y/o F w/ hx of T2DM, gallstones, schizophrenia w/ multiple inpatient psychiatric admissions, now p/w diffuse abdominal pain x 1 day, elevated lipase and lactate, likely acute gallstone pancreatitis     --NPO  --NS @150, 1L NS bolus given in ED  --Pain control  --Eventual cholecystectomy this admission, may require MRCP/intraoperative cholangiogram given dilated CBD  --Continue home Depakote, Seroquel

## 2019-12-29 NOTE — PLAN OF CARE
Problem: PAIN - ADULT  Goal: Verbalizes/displays adequate comfort level or baseline comfort level  Description  Interventions:  - Encourage patient to monitor pain and request assistance  - Assess pain using appropriate pain scale  - Administer analgesics based on type and severity of pain and evaluate response  - Implement non-pharmacological measures as appropriate and evaluate response  - Consider cultural and social influences on pain and pain management  - Notify physician/advanced practitioner if interventions unsuccessful or patient reports new pain  Outcome: Progressing     Problem: INFECTION - ADULT  Goal: Absence or prevention of progression during hospitalization  Description  INTERVENTIONS:  - Assess and monitor for signs and symptoms of infection  - Monitor lab/diagnostic results  - Monitor all insertion sites, i e  indwelling lines, tubes, and drains  - Monitor endotracheal if appropriate and nasal secretions for changes in amount and color  - [place] appropriate cooling/warming therapies per order  - Administer medications as ordered  - Instruct and encourage patient and family to use good hand hygiene technique  - Identify and instruct in appropriate isolation precautions for identified infection/condition  Outcome: Progressing  Goal: Absence of fever/infection during neutropenic period  Description  INTERVENTIONS:  - Monitor WBC    Outcome: Progressing     Problem: SAFETY ADULT  Goal: Patient will remain free of falls  Description  INTERVENTIONS:  - Assess patient frequently for physical needs  -  Identify cognitive and physical deficits and behaviors that affect risk of falls    -  [place] fall precautions as indicated by assessment   - Educate patient/family on patient safety including physical limitations  - Instruct patient to call for assistance with activity based on assessment  - Modify environment to reduce risk of injury  - Consider OT/PT consult to assist with strengthening/mobility  Outcome: Progressing  Goal: Maintain or return to baseline ADL function  Description  INTERVENTIONS:  -  Assess patient's ability to carry out ADLs; assess patient's baseline for ADL function and identify physical deficits which impact ability to perform ADLs (bathing, care of mouth/teeth, toileting, grooming, dressing, etc )  - Assess/evaluate cause of self-care deficits   - Assess range of motion  - Assess patient's mobility; develop plan if impaired  - Assess patient's need for assistive devices and provide as appropriate  - Encourage maximum independence but intervene and supervise when necessary  - Involve family in performance of ADLs  - Assess for home care needs following discharge   - Consider OT consult to assist with ADL evaluation and planning for discharge  - Provide patient education as appropriate  Outcome: Progressing  Goal: Maintain or return mobility status to optimal level  Description  INTERVENTIONS:  - Assess patient's baseline mobility status (ambulation, transfers, stairs, etc )    - Identify cognitive and physical deficits and behaviors that affect mobility  - Identify mobility aids required to assist with transfers and/or ambulation (gait belt, sit-to-stand, lift, walker, cane, etc )  - [place] fall precautions as indicated by assessment  - Record patient progress and toleration of activity level on Mobility SBAR; progress patient to next Phase/Stage  - Instruct patient to call for assistance with activity based on assessment  - Consider rehabilitation consult to assist with strengthening/weightbearing, etc   Outcome: Progressing     Problem: DISCHARGE PLANNING  Goal: Discharge to home or other facility with appropriate resources  Description  INTERVENTIONS:  - Identify barriers to discharge w/patient and caregiver  - Arrange for needed discharge resources and transportation as appropriate  - Identify discharge learning needs (meds, wound care, etc )  - Arrange for interpretive services to assist at discharge as needed  - Refer to Case Management Department for coordinating discharge planning if the patient needs post-hospital services based on physician/advanced practitioner order or complex needs related to functional status, cognitive ability, or social support system  Outcome: Progressing     Problem: Knowledge Deficit  Goal: Patient/family/caregiver demonstrates understanding of disease process, treatment plan, medications, and discharge instructions  Description  Complete learning assessment and assess knowledge base    Interventions:  - Provide teaching at level of understanding  - Provide teaching via preferred learning methods  Outcome: Progressing

## 2019-12-29 NOTE — H&P
H&P Exam - General Surgery   Edgar Chi 62 y o  female MRN: [id number]  Unit/Bed#: ED 24 Encounter: [id number]    Assessment/Plan     Assessment:  63 y/o F w/ hx of T2DM, gallstones, schizophrenia w/ multiple inpatient psychiatric admissions, now p/w diffuse abdominal pain x 1 day, elevated lipase and lactate, likely acute gallstone pancreatitis     Plan:  --NPO  --NS @150, 1L NS bolus given in ED  --Pain control  --Eventual cholecystectomy this admission, may require MRCP/intraoperative cholangiogram given dilated CBD    History of Present Illness     HPI:  Edgar Chi is a 62 y o  female w/ hx of T2DM, gallstones, schizophrenia w/ multiple inpatient psychiatric admissions, who p/w abdominal pain x 1 day  WBC of 12, with elevated lipase of 2,308, elevated lactate of 2 1  Normal TBili/LFTs  RUQ U/S shows peripancreatic fluid, cholelithiasis, dilated CBD at 11mm, without any choledocholithiasis, pericholecystic fluid, or gallbladder wall thickening  Pt reports her pain is diffuse and sharp, rating currently as 1/10 after receiving pain medication  Pt has had multiple episodes of symptomatic cholelithiasis in the past 5 years, all of which were brought on by eating fatty foods and eventually self-resolved  Denies any past surgical hx  Denies any fevers, chills, N/V/D  Review of Systems   Constitutional: Negative for chills, diaphoresis, fatigue and fever  HENT: Negative for congestion, postnasal drip, rhinorrhea, sinus pressure, sinus pain, sore throat and trouble swallowing  Eyes: Negative for visual disturbance  Respiratory: Negative for cough, chest tightness, shortness of breath, wheezing and stridor  Cardiovascular: Negative for chest pain, palpitations and leg swelling  Gastrointestinal: Positive for abdominal pain  Negative for abdominal distention, constipation, diarrhea, nausea and vomiting  Endocrine: Negative for polyphagia  Genitourinary: Negative for flank pain and urgency  Musculoskeletal: Negative for arthralgias, back pain, gait problem, joint swelling, myalgias, neck pain and neck stiffness  Skin: Negative for color change, pallor, rash and wound  Allergic/Immunologic: Negative for environmental allergies, food allergies and immunocompromised state  Neurological: Negative for dizziness, facial asymmetry, light-headedness and headaches  Psychiatric/Behavioral: Negative for agitation, behavioral problems and confusion  Historical Information   History reviewed  No pertinent past medical history  History reviewed  No pertinent surgical history  Social History   Social History     Substance and Sexual Activity   Alcohol Use Never    Frequency: Never     Social History     Substance and Sexual Activity   Drug Use Never     Social History     Tobacco Use   Smoking Status Former Smoker   Smokeless Tobacco Never Used     Family History: History reviewed  No pertinent family history  Meds/Allergies   PTA meds:   None     No Known Allergies    Objective   First Vitals:   Blood Pressure: 168/73 (12/28/19 2000)  Pulse: 104 (12/28/19 2000)  Temperature: 98 2 °F (36 8 °C) (12/28/19 2030)  Temp Source: Oral (12/28/19 2030)  Respirations: 19 (12/28/19 2000)  SpO2: 98 % (12/28/19 2000)    Current Vitals:   Blood Pressure: 161/74 (12/28/19 2100)  Pulse: 92 (12/28/19 2100)  Temperature: 98 2 °F (36 8 °C) (12/28/19 2030)  Temp Source: Oral (12/28/19 2030)  Respirations: 18 (12/28/19 2100)  SpO2: 98 % (12/28/19 2100)    No intake or output data in the 24 hours ending 12/28/19 2126    Invasive Devices     Peripheral Intravenous Line            Peripheral IV 12/28/19 Left Antecubital less than 1 day                Physical Exam   Constitutional: She is oriented to person, place, and time  She appears well-developed and well-nourished  No distress  HENT:   Head: Normocephalic and atraumatic  Eyes: EOM are normal    Neck: Normal range of motion  Neck supple     Cardiovascular: Normal rate and regular rhythm  Pulmonary/Chest: Effort normal    Abdominal: Soft  She exhibits no distension  There is no tenderness  Musculoskeletal: Normal range of motion  Neurological: She is alert and oriented to person, place, and time  Skin: Skin is warm  She is not diaphoretic  Psychiatric: She has a normal mood and affect  Her behavior is normal    Vitals reviewed        Lab Results:   CBC:   Lab Results   Component Value Date    WBC 12 43 (H) 12/28/2019    HGB 13 8 12/28/2019    HCT 42 4 12/28/2019    MCV 94 12/28/2019     12/28/2019    MCH 30 7 12/28/2019    MCHC 32 5 12/28/2019    RDW 13 6 12/28/2019    MPV 10 9 12/28/2019    NRBC 0 12/28/2019   , CMP:   Lab Results   Component Value Date    SODIUM 135 (L) 12/28/2019    K 4 8 12/28/2019     12/28/2019    CO2 29 12/28/2019    BUN 16 12/28/2019    CREATININE 1 32 (H) 12/28/2019    CALCIUM 10 0 12/28/2019    AST 18 12/28/2019    ALT 22 12/28/2019    ALKPHOS 88 12/28/2019    EGFR 52 12/28/2019   , Coagulation: No results found for: PT, INR, APTT, Urinalysis: No results found for: Georjean Shall, SPECGRAV, PHUR, LEUKOCYTESUR, NITRITE, PROTEINUA, GLUCOSEU, KETONESU, BILIRUBINUR, BLOODU, Amylase: No results found for: AMYLASE, Lipase:   Lab Results   Component Value Date    LIPASE 2,308 (H) 12/28/2019     Imaging:    US right upper quadrant    (Results Pending)       Code Status: No Order  Advance Directive and Living Will:      Power of :    POLST:

## 2019-12-30 ENCOUNTER — APPOINTMENT (INPATIENT)
Dept: RADIOLOGY | Facility: HOSPITAL | Age: 54
DRG: 418 | End: 2019-12-30
Payer: MEDICARE

## 2019-12-30 LAB
ALBUMIN SERPL BCP-MCNC: 2.4 G/DL (ref 3.5–5)
ALP SERPL-CCNC: 77 U/L (ref 46–116)
ALT SERPL W P-5'-P-CCNC: 17 U/L (ref 12–78)
ANION GAP SERPL CALCULATED.3IONS-SCNC: 6 MMOL/L (ref 4–13)
AST SERPL W P-5'-P-CCNC: 16 U/L (ref 5–45)
BILIRUB DIRECT SERPL-MCNC: 0.14 MG/DL (ref 0–0.2)
BILIRUB SERPL-MCNC: 0.32 MG/DL (ref 0.2–1)
BUN SERPL-MCNC: 8 MG/DL (ref 5–25)
CALCIUM SERPL-MCNC: 8.7 MG/DL (ref 8.3–10.1)
CHLORIDE SERPL-SCNC: 110 MMOL/L (ref 100–108)
CO2 SERPL-SCNC: 24 MMOL/L (ref 21–32)
CREAT SERPL-MCNC: 1.03 MG/DL (ref 0.6–1.3)
GFR SERPL CREATININE-BSD FRML MDRD: 70 ML/MIN/1.73SQ M
GLUCOSE SERPL-MCNC: 104 MG/DL (ref 65–140)
POTASSIUM SERPL-SCNC: 4.1 MMOL/L (ref 3.5–5.3)
PROT SERPL-MCNC: 5.9 G/DL (ref 6.4–8.2)
SODIUM SERPL-SCNC: 140 MMOL/L (ref 136–145)

## 2019-12-30 PROCEDURE — 80048 BASIC METABOLIC PNL TOTAL CA: CPT | Performed by: SURGERY

## 2019-12-30 PROCEDURE — 74181 MRI ABDOMEN W/O CONTRAST: CPT

## 2019-12-30 PROCEDURE — 99232 SBSQ HOSP IP/OBS MODERATE 35: CPT | Performed by: SURGERY

## 2019-12-30 PROCEDURE — 80076 HEPATIC FUNCTION PANEL: CPT | Performed by: SURGERY

## 2019-12-30 RX ADMIN — OXYCODONE HYDROCHLORIDE 5 MG: 5 TABLET ORAL at 05:38

## 2019-12-30 RX ADMIN — SODIUM CHLORIDE 125 ML/HR: 0.9 INJECTION, SOLUTION INTRAVENOUS at 05:38

## 2019-12-30 RX ADMIN — QUETIAPINE FUMARATE 100 MG: 100 TABLET ORAL at 21:58

## 2019-12-30 RX ADMIN — DIVALPROEX SODIUM 500 MG: 125 CAPSULE, COATED PELLETS ORAL at 08:46

## 2019-12-30 RX ADMIN — OXYCODONE HYDROCHLORIDE 5 MG: 5 TABLET ORAL at 21:58

## 2019-12-30 RX ADMIN — DIVALPROEX SODIUM 1000 MG: 125 CAPSULE, COATED PELLETS ORAL at 21:58

## 2019-12-30 RX ADMIN — HEPARIN SODIUM 5000 UNITS: 5000 INJECTION INTRAVENOUS; SUBCUTANEOUS at 13:28

## 2019-12-30 RX ADMIN — HEPARIN SODIUM 5000 UNITS: 5000 INJECTION INTRAVENOUS; SUBCUTANEOUS at 21:58

## 2019-12-30 RX ADMIN — HEPARIN SODIUM 5000 UNITS: 5000 INJECTION INTRAVENOUS; SUBCUTANEOUS at 05:38

## 2019-12-30 RX ADMIN — SODIUM CHLORIDE 100 ML/HR: 0.9 INJECTION, SOLUTION INTRAVENOUS at 16:57

## 2019-12-30 RX ADMIN — OXYCODONE HYDROCHLORIDE 5 MG: 5 TABLET ORAL at 10:45

## 2019-12-30 RX ADMIN — ACETAMINOPHEN 650 MG: 325 TABLET ORAL at 08:46

## 2019-12-30 NOTE — PROGRESS NOTES
Progress Note - General Surgery   Dougie Luciano 62 y o  female MRN: [id number]  Unit/Bed#: [facility] 830-01 Encounter: [id number]    Assessment:  54F with acute GS pancreatitis, clinically improving    - Tolerated CLD, no N/V, no further abdominal pain    Plan:  Advance diet as tolerated  Fluids decreased as further diet tolerance  Need's MRCP  Plan for lap cholecystectomy after MRCP  OOB/Ambulate  DVT PPx  Pain control PRN    Subjective/Objective   Chief Complaint:     Subjective: No acute events  Resting in bed, tolerated clears  Passing flatus    Objective:     Blood pressure 125/74, pulse 102, temperature 99 4 °F (37 4 °C), resp  rate 17, height 5' 5" (1 651 m), weight 77 1 kg (170 lb), SpO2 97 %, not currently breastfeeding  ,Body mass index is 28 29 kg/m²  Intake/Output Summary (Last 24 hours) at 12/30/2019 7205  Last data filed at 12/30/2019 0601  Gross per 24 hour   Intake 3486 67 ml   Output 1450 ml   Net 2036 67 ml       Invasive Devices     Peripheral Intravenous Line            Peripheral IV 12/28/19 Left Antecubital 1 day                Physical Exam: General: AAOx3  Head: normocephalic, atraumatic  Neck: supple, trachea midline   Respiratory: BS b/l  Abdomen: Soft, non tender, no rebound/guarding  Heart: RRR, S1s2  Ext: Warm no cyanosis   Pulse: 2+ radial    Lab, Imaging and other studies:I have personally reviewed pertinent lab results    , CBC: No results found for: WBC, HGB, HCT, MCV, PLT, ADJUSTEDWBC, MCH, MCHC, RDW, MPV, NRBC, CMP: No results found for: SODIUM, K, CL, CO2, ANIONGAP, BUN, CREATININE, GLUCOSE, CALCIUM, AST, ALT, ALKPHOS, PROT, BILITOT, EGFR  VTE Pharmacologic Prophylaxis: Heparin  VTE Mechanical Prophylaxis: sequential compression device

## 2019-12-30 NOTE — PLAN OF CARE
Problem: PAIN - ADULT  Goal: Verbalizes/displays adequate comfort level or baseline comfort level  Description  Interventions:  - Encourage patient to monitor pain and request assistance  - Assess pain using appropriate pain scale  - Administer analgesics based on type and severity of pain and evaluate response  - Implement non-pharmacological measures as appropriate and evaluate response  - Consider cultural and social influences on pain and pain management  - Notify physician/advanced practitioner if interventions unsuccessful or patient reports new pain  Outcome: Progressing     Problem: INFECTION - ADULT  Goal: Absence or prevention of progression during hospitalization  Description  INTERVENTIONS:  - Assess and monitor for signs and symptoms of infection  - Monitor lab/diagnostic results  - Monitor all insertion sites, i e  indwelling lines, tubes, and drains  - Monitor endotracheal if appropriate and nasal secretions for changes in amount and color  - [place] appropriate cooling/warming therapies per order  - Administer medications as ordered  - Instruct and encourage patient and family to use good hand hygiene technique  - Identify and instruct in appropriate isolation precautions for identified infection/condition  Outcome: Progressing  Goal: Absence of fever/infection during neutropenic period  Description  INTERVENTIONS:  - Monitor WBC    Outcome: Progressing     Problem: SAFETY ADULT  Goal: Patient will remain free of falls  Description  INTERVENTIONS:  - Assess patient frequently for physical needs  -  Identify cognitive and physical deficits and behaviors that affect risk of falls    -  [place] fall precautions as indicated by assessment   - Educate patient/family on patient safety including physical limitations  - Instruct patient to call for assistance with activity based on assessment  - Modify environment to reduce risk of injury  - Consider OT/PT consult to assist with strengthening/mobility  Outcome: Progressing  Goal: Maintain or return to baseline ADL function  Description  INTERVENTIONS:  -  Assess patient's ability to carry out ADLs; assess patient's baseline for ADL function and identify physical deficits which impact ability to perform ADLs (bathing, care of mouth/teeth, toileting, grooming, dressing, etc )  - Assess/evaluate cause of self-care deficits   - Assess range of motion  - Assess patient's mobility; develop plan if impaired  - Assess patient's need for assistive devices and provide as appropriate  - Encourage maximum independence but intervene and supervise when necessary  - Involve family in performance of ADLs  - Assess for home care needs following discharge   - Consider OT consult to assist with ADL evaluation and planning for discharge  - Provide patient education as appropriate  Outcome: Progressing  Goal: Maintain or return mobility status to optimal level  Description  INTERVENTIONS:  - Assess patient's baseline mobility status (ambulation, transfers, stairs, etc )    - Identify cognitive and physical deficits and behaviors that affect mobility  - Identify mobility aids required to assist with transfers and/or ambulation (gait belt, sit-to-stand, lift, walker, cane, etc )  - [place] fall precautions as indicated by assessment  - Record patient progress and toleration of activity level on Mobility SBAR; progress patient to next Phase/Stage  - Instruct patient to call for assistance with activity based on assessment  - Consider rehabilitation consult to assist with strengthening/weightbearing, etc   Outcome: Progressing     Problem: DISCHARGE PLANNING  Goal: Discharge to home or other facility with appropriate resources  Description  INTERVENTIONS:  - Identify barriers to discharge w/patient and caregiver  - Arrange for needed discharge resources and transportation as appropriate  - Identify discharge learning needs (meds, wound care, etc )  - Arrange for interpretive services to assist at discharge as needed  - Refer to Case Management Department for coordinating discharge planning if the patient needs post-hospital services based on physician/advanced practitioner order or complex needs related to functional status, cognitive ability, or social support system  Outcome: Progressing     Problem: Knowledge Deficit  Goal: Patient/family/caregiver demonstrates understanding of disease process, treatment plan, medications, and discharge instructions  Description  Complete learning assessment and assess knowledge base    Interventions:  - Provide teaching at level of understanding  - Provide teaching via preferred learning methods  Outcome: Progressing     Problem: GASTROINTESTINAL - ADULT  Goal: Minimal or absence of nausea and/or vomiting  Description  INTERVENTIONS:  - Administer IV fluids if ordered to ensure adequate hydration  - Maintain NPO status until nausea and vomiting are resolved  - Nasogastric tube if ordered  - Administer ordered antiemetic medications as needed  - Provide nonpharmacologic comfort measures as appropriate  - Advance diet as tolerated, if ordered  - Consider nutrition services referral to assist patient with adequate nutrition and appropriate food choices  Outcome: Progressing  Goal: Maintains or returns to baseline bowel function  Description  INTERVENTIONS:  - Assess bowel function  - Encourage oral fluids to ensure adequate hydration  - Administer IV fluids if ordered to ensure adequate hydration  - Administer ordered medications as needed  - Encourage mobilization and activity  - Consider nutritional services referral to assist patient with adequate nutrition and appropriate food choices  Outcome: Progressing  Goal: Maintains adequate nutritional intake  Description  INTERVENTIONS:  - Monitor percentage of each meal consumed  - Identify factors contributing to decreased intake, treat as appropriate  - Assist with meals as needed  - Monitor I&O, weight, and lab values if indicated  - Obtain nutrition services referral as needed  Outcome: Progressing

## 2019-12-30 NOTE — PLAN OF CARE
Problem: PAIN - ADULT  Goal: Verbalizes/displays adequate comfort level or baseline comfort level  Description  Interventions:  - Encourage patient to monitor pain and request assistance  - Assess pain using appropriate pain scale  - Administer analgesics based on type and severity of pain and evaluate response  - Implement non-pharmacological measures as appropriate and evaluate response  - Consider cultural and social influences on pain and pain management  - Notify physician/advanced practitioner if interventions unsuccessful or patient reports new pain  Outcome: Progressing     Problem: INFECTION - ADULT  Goal: Absence or prevention of progression during hospitalization  Description  INTERVENTIONS:  - Assess and monitor for signs and symptoms of infection  - Monitor lab/diagnostic results  - Monitor all insertion sites, i e  indwelling lines, tubes, and drains  - Monitor endotracheal if appropriate and nasal secretions for changes in amount and color  - [place] appropriate cooling/warming therapies per order  - Administer medications as ordered  - Instruct and encourage patient and family to use good hand hygiene technique  - Identify and instruct in appropriate isolation precautions for identified infection/condition  Outcome: Progressing  Goal: Absence of fever/infection during neutropenic period  Description  INTERVENTIONS:  - Monitor WBC    Outcome: Progressing     Problem: SAFETY ADULT  Goal: Patient will remain free of falls  Description  INTERVENTIONS:  - Assess patient frequently for physical needs  -  Identify cognitive and physical deficits and behaviors that affect risk of falls    -  [place] fall precautions as indicated by assessment   - Educate patient/family on patient safety including physical limitations  - Instruct patient to call for assistance with activity based on assessment  - Modify environment to reduce risk of injury  - Consider OT/PT consult to assist with strengthening/mobility  Outcome: Progressing  Goal: Maintain or return to baseline ADL function  Description  INTERVENTIONS:  -  Assess patient's ability to carry out ADLs; assess patient's baseline for ADL function and identify physical deficits which impact ability to perform ADLs (bathing, care of mouth/teeth, toileting, grooming, dressing, etc )  - Assess/evaluate cause of self-care deficits   - Assess range of motion  - Assess patient's mobility; develop plan if impaired  - Assess patient's need for assistive devices and provide as appropriate  - Encourage maximum independence but intervene and supervise when necessary  - Involve family in performance of ADLs  - Assess for home care needs following discharge   - Consider OT consult to assist with ADL evaluation and planning for discharge  - Provide patient education as appropriate  Outcome: Progressing  Goal: Maintain or return mobility status to optimal level  Description  INTERVENTIONS:  - Assess patient's baseline mobility status (ambulation, transfers, stairs, etc )    - Identify cognitive and physical deficits and behaviors that affect mobility  - Identify mobility aids required to assist with transfers and/or ambulation (gait belt, sit-to-stand, lift, walker, cane, etc )  - [place] fall precautions as indicated by assessment  - Record patient progress and toleration of activity level on Mobility SBAR; progress patient to next Phase/Stage  - Instruct patient to call for assistance with activity based on assessment  - Consider rehabilitation consult to assist with strengthening/weightbearing, etc   Outcome: Progressing     Problem: DISCHARGE PLANNING  Goal: Discharge to home or other facility with appropriate resources  Description  INTERVENTIONS:  - Identify barriers to discharge w/patient and caregiver  - Arrange for needed discharge resources and transportation as appropriate  - Identify discharge learning needs (meds, wound care, etc )  - Arrange for interpretive services to assist at discharge as needed  - Refer to Case Management Department for coordinating discharge planning if the patient needs post-hospital services based on physician/advanced practitioner order or complex needs related to functional status, cognitive ability, or social support system  Outcome: Progressing     Problem: Knowledge Deficit  Goal: Patient/family/caregiver demonstrates understanding of disease process, treatment plan, medications, and discharge instructions  Description  Complete learning assessment and assess knowledge base    Interventions:  - Provide teaching at level of understanding  - Provide teaching via preferred learning methods  Outcome: Progressing     Problem: GASTROINTESTINAL - ADULT  Goal: Minimal or absence of nausea and/or vomiting  Description  INTERVENTIONS:  - Administer IV fluids if ordered to ensure adequate hydration  - Maintain NPO status until nausea and vomiting are resolved  - Nasogastric tube if ordered  - Administer ordered antiemetic medications as needed  - Provide nonpharmacologic comfort measures as appropriate  - Advance diet as tolerated, if ordered  - Consider nutrition services referral to assist patient with adequate nutrition and appropriate food choices  Outcome: Progressing  Goal: Maintains or returns to baseline bowel function  Description  INTERVENTIONS:  - Assess bowel function  - Encourage oral fluids to ensure adequate hydration  - Administer IV fluids if ordered to ensure adequate hydration  - Administer ordered medications as needed  - Encourage mobilization and activity  - Consider nutritional services referral to assist patient with adequate nutrition and appropriate food choices  Outcome: Progressing  Goal: Maintains adequate nutritional intake  Description  INTERVENTIONS:  - Monitor percentage of each meal consumed  - Identify factors contributing to decreased intake, treat as appropriate  - Assist with meals as needed  - Monitor I&O, weight, and lab values if indicated  - Obtain nutrition services referral as needed  Outcome: Progressing

## 2019-12-31 ENCOUNTER — ANESTHESIA EVENT (INPATIENT)
Dept: PERIOP | Facility: HOSPITAL | Age: 54
DRG: 418 | End: 2019-12-31
Payer: MEDICARE

## 2019-12-31 ENCOUNTER — ANESTHESIA (INPATIENT)
Dept: PERIOP | Facility: HOSPITAL | Age: 54
DRG: 418 | End: 2019-12-31
Payer: MEDICARE

## 2019-12-31 LAB
ABO GROUP BLD: NORMAL
ANION GAP SERPL CALCULATED.3IONS-SCNC: 4 MMOL/L (ref 4–13)
BASOPHILS # BLD AUTO: 0.01 THOUSANDS/ΜL (ref 0–0.1)
BASOPHILS NFR BLD AUTO: 0 % (ref 0–1)
BLD GP AB SCN SERPL QL: NEGATIVE
BUN SERPL-MCNC: 5 MG/DL (ref 5–25)
CALCIUM SERPL-MCNC: 9.2 MG/DL (ref 8.3–10.1)
CHLORIDE SERPL-SCNC: 109 MMOL/L (ref 100–108)
CO2 SERPL-SCNC: 26 MMOL/L (ref 21–32)
CREAT SERPL-MCNC: 0.96 MG/DL (ref 0.6–1.3)
EOSINOPHIL # BLD AUTO: 0.01 THOUSAND/ΜL (ref 0–0.61)
EOSINOPHIL NFR BLD AUTO: 0 % (ref 0–6)
ERYTHROCYTE [DISTWIDTH] IN BLOOD BY AUTOMATED COUNT: 14 % (ref 11.6–15.1)
GFR SERPL CREATININE-BSD FRML MDRD: 76 ML/MIN/1.73SQ M
GLUCOSE SERPL-MCNC: 86 MG/DL (ref 65–140)
HCT VFR BLD AUTO: 35.8 % (ref 34.8–46.1)
HGB BLD-MCNC: 11.3 G/DL (ref 11.5–15.4)
IMM GRANULOCYTES # BLD AUTO: 0.04 THOUSAND/UL (ref 0–0.2)
IMM GRANULOCYTES NFR BLD AUTO: 1 % (ref 0–2)
LYMPHOCYTES # BLD AUTO: 2.19 THOUSANDS/ΜL (ref 0.6–4.47)
LYMPHOCYTES NFR BLD AUTO: 29 % (ref 14–44)
MCH RBC QN AUTO: 30.6 PG (ref 26.8–34.3)
MCHC RBC AUTO-ENTMCNC: 31.6 G/DL (ref 31.4–37.4)
MCV RBC AUTO: 97 FL (ref 82–98)
MONOCYTES # BLD AUTO: 1.23 THOUSAND/ΜL (ref 0.17–1.22)
MONOCYTES NFR BLD AUTO: 17 % (ref 4–12)
NEUTROPHILS # BLD AUTO: 3.99 THOUSANDS/ΜL (ref 1.85–7.62)
NEUTS SEG NFR BLD AUTO: 53 % (ref 43–75)
NRBC BLD AUTO-RTO: 0 /100 WBCS
PLATELET # BLD AUTO: 119 THOUSANDS/UL (ref 149–390)
PMV BLD AUTO: 11.5 FL (ref 8.9–12.7)
POTASSIUM SERPL-SCNC: 4 MMOL/L (ref 3.5–5.3)
RBC # BLD AUTO: 3.69 MILLION/UL (ref 3.81–5.12)
RH BLD: POSITIVE
SODIUM SERPL-SCNC: 139 MMOL/L (ref 136–145)
SPECIMEN EXPIRATION DATE: NORMAL
WBC # BLD AUTO: 7.47 THOUSAND/UL (ref 4.31–10.16)

## 2019-12-31 PROCEDURE — 86850 RBC ANTIBODY SCREEN: CPT | Performed by: SURGERY

## 2019-12-31 PROCEDURE — 86901 BLOOD TYPING SEROLOGIC RH(D): CPT | Performed by: SURGERY

## 2019-12-31 PROCEDURE — 85025 COMPLETE CBC W/AUTO DIFF WBC: CPT | Performed by: SURGERY

## 2019-12-31 PROCEDURE — 80048 BASIC METABOLIC PNL TOTAL CA: CPT | Performed by: SURGERY

## 2019-12-31 PROCEDURE — 99232 SBSQ HOSP IP/OBS MODERATE 35: CPT | Performed by: SURGERY

## 2019-12-31 PROCEDURE — 86900 BLOOD TYPING SEROLOGIC ABO: CPT | Performed by: SURGERY

## 2019-12-31 RX ORDER — CEFAZOLIN SODIUM 2 G/50ML
2000 SOLUTION INTRAVENOUS
Status: COMPLETED | OUTPATIENT
Start: 2020-01-01 | End: 2020-01-01

## 2019-12-31 RX ORDER — DEXTROSE, SODIUM CHLORIDE, AND POTASSIUM CHLORIDE 5; .45; .15 G/100ML; G/100ML; G/100ML
100 INJECTION INTRAVENOUS CONTINUOUS
Status: DISCONTINUED | OUTPATIENT
Start: 2020-01-01 | End: 2020-01-01

## 2019-12-31 RX ADMIN — HEPARIN SODIUM 5000 UNITS: 5000 INJECTION INTRAVENOUS; SUBCUTANEOUS at 14:12

## 2019-12-31 RX ADMIN — SODIUM CHLORIDE 100 ML/HR: 0.9 INJECTION, SOLUTION INTRAVENOUS at 05:04

## 2019-12-31 RX ADMIN — DIVALPROEX SODIUM 1000 MG: 125 CAPSULE, COATED PELLETS ORAL at 21:11

## 2019-12-31 RX ADMIN — SODIUM CHLORIDE 50 ML/HR: 0.9 INJECTION, SOLUTION INTRAVENOUS at 21:16

## 2019-12-31 RX ADMIN — DIVALPROEX SODIUM 500 MG: 125 CAPSULE, COATED PELLETS ORAL at 09:04

## 2019-12-31 RX ADMIN — OXYCODONE HYDROCHLORIDE 5 MG: 5 TABLET ORAL at 10:10

## 2019-12-31 RX ADMIN — HEPARIN SODIUM 5000 UNITS: 5000 INJECTION INTRAVENOUS; SUBCUTANEOUS at 05:04

## 2019-12-31 RX ADMIN — DEXTROSE, SODIUM CHLORIDE, AND POTASSIUM CHLORIDE 100 ML/HR: 5; .45; .15 INJECTION INTRAVENOUS at 23:28

## 2019-12-31 RX ADMIN — HEPARIN SODIUM 5000 UNITS: 5000 INJECTION INTRAVENOUS; SUBCUTANEOUS at 21:11

## 2019-12-31 RX ADMIN — OXYCODONE HYDROCHLORIDE 5 MG: 5 TABLET ORAL at 19:52

## 2019-12-31 RX ADMIN — OXYCODONE HYDROCHLORIDE 5 MG: 5 TABLET ORAL at 14:12

## 2019-12-31 RX ADMIN — QUETIAPINE FUMARATE 100 MG: 100 TABLET ORAL at 21:11

## 2019-12-31 NOTE — PROGRESS NOTES
Progress Note - General Surgery   Rodolfo Webb 62 y o  female MRN: [id number]  Unit/Bed#: [facility] 830-01 Encounter: [id number]    Assessment:  54F with acute GS pancreatitis, clinically improving    Plan:  Clears toast/crackers  Decrease IVF  F/u MRCP read  Will plan for lap ladi this admission  Prn pain control  DVT ppx    Subjective/Objective   Chief Complaint:     Subjective: GEOFFREY  Pain improving, 5/10  Tolerating clears  Passing flatus, no BM  Objective:     Blood pressure 154/86, pulse 88, temperature 98 1 °F (36 7 °C), resp  rate 18, height 5' 5" (1 651 m), weight 77 1 kg (170 lb), SpO2 96 %, not currently breastfeeding  ,Body mass index is 28 29 kg/m²  Intake/Output Summary (Last 24 hours) at 12/31/2019 0555  Last data filed at 12/31/2019 0503  Gross per 24 hour   Intake 3221 67 ml   Output 2325 ml   Net 896 67 ml       Invasive Devices     Peripheral Intravenous Line            Peripheral IV 12/28/19 Left Antecubital 2 days                Physical Exam: NAD  Atraumatic/normocephalic  AAOX3  Normal mood and affect  Normal respiratory effort  Soft, mildly tender epigastrium, ND  Skin warm/dry  Cap refil <2 sec      Lab, Imaging and other studies:  I have personally reviewed pertinent lab results    , CBC: No results found for: WBC, HGB, HCT, MCV, PLT, ADJUSTEDWBC, MCH, MCHC, RDW, MPV, NRBC, CMP:   Lab Results   Component Value Date    SODIUM 140 12/30/2019    K 4 1 12/30/2019     (H) 12/30/2019    CO2 24 12/30/2019    BUN 8 12/30/2019    CREATININE 1 03 12/30/2019    CALCIUM 8 7 12/30/2019    AST 16 12/30/2019    ALT 17 12/30/2019    ALKPHOS 77 12/30/2019    EGFR 70 12/30/2019     VTE Pharmacologic Prophylaxis: Heparin  VTE Mechanical Prophylaxis: sequential compression device

## 2019-12-31 NOTE — SOCIAL WORK
Met with patient they live in ranch style home with 2STE  Uses [facility] [address]  Independent Pta  Lives with significant other  Denies MH or Substance issues  No hx of VNA  CM reviewed d/c planning process including the following: identifying help at home, patient preference for d/c planning needs, Discharge Lounge, [facility] Meds to Bed program, availability of treatment team to discuss questions or concerns patient and/or family may have regarding understanding medications and recognizing signs and symptoms once discharged  CM also encouraged patient to follow up with all recommended appointments after discharge  Patient advised of importance for patient and family to participate in managing patients medical well being

## 2019-12-31 NOTE — PLAN OF CARE
Problem: PAIN - ADULT  Goal: Verbalizes/displays adequate comfort level or baseline comfort level  Description  Interventions:  - Encourage patient to monitor pain and request assistance  - Assess pain using appropriate pain scale  - Administer analgesics based on type and severity of pain and evaluate response  - Implement non-pharmacological measures as appropriate and evaluate response  - Consider cultural and social influences on pain and pain management  - Notify physician/advanced practitioner if interventions unsuccessful or patient reports new pain  Outcome: Progressing     Problem: INFECTION - ADULT  Goal: Absence or prevention of progression during hospitalization  Description  INTERVENTIONS:  - Assess and monitor for signs and symptoms of infection  - Monitor lab/diagnostic results  - Monitor all insertion sites, i e  indwelling lines, tubes, and drains  - Monitor endotracheal if appropriate and nasal secretions for changes in amount and color  - [place] appropriate cooling/warming therapies per order  - Administer medications as ordered  - Instruct and encourage patient and family to use good hand hygiene technique  - Identify and instruct in appropriate isolation precautions for identified infection/condition  Outcome: Progressing  Goal: Absence of fever/infection during neutropenic period  Description  INTERVENTIONS:  - Monitor WBC    Outcome: Progressing     Problem: SAFETY ADULT  Goal: Patient will remain free of falls  Description  INTERVENTIONS:  - Assess patient frequently for physical needs  -  Identify cognitive and physical deficits and behaviors that affect risk of falls    -  [place] fall precautions as indicated by assessment   - Educate patient/family on patient safety including physical limitations  - Instruct patient to call for assistance with activity based on assessment  - Modify environment to reduce risk of injury  - Consider OT/PT consult to assist with strengthening/mobility  Outcome: Progressing  Goal: Maintain or return to baseline ADL function  Description  INTERVENTIONS:  -  Assess patient's ability to carry out ADLs; assess patient's baseline for ADL function and identify physical deficits which impact ability to perform ADLs (bathing, care of mouth/teeth, toileting, grooming, dressing, etc )  - Assess/evaluate cause of self-care deficits   - Assess range of motion  - Assess patient's mobility; develop plan if impaired  - Assess patient's need for assistive devices and provide as appropriate  - Encourage maximum independence but intervene and supervise when necessary  - Involve family in performance of ADLs  - Assess for home care needs following discharge   - Consider OT consult to assist with ADL evaluation and planning for discharge  - Provide patient education as appropriate  Outcome: Progressing  Goal: Maintain or return mobility status to optimal level  Description  INTERVENTIONS:  - Assess patient's baseline mobility status (ambulation, transfers, stairs, etc )    - Identify cognitive and physical deficits and behaviors that affect mobility  - Identify mobility aids required to assist with transfers and/or ambulation (gait belt, sit-to-stand, lift, walker, cane, etc )  - [place] fall precautions as indicated by assessment  - Record patient progress and toleration of activity level on Mobility SBAR; progress patient to next Phase/Stage  - Instruct patient to call for assistance with activity based on assessment  - Consider rehabilitation consult to assist with strengthening/weightbearing, etc   Outcome: Progressing     Problem: DISCHARGE PLANNING  Goal: Discharge to home or other facility with appropriate resources  Description  INTERVENTIONS:  - Identify barriers to discharge w/patient and caregiver  - Arrange for needed discharge resources and transportation as appropriate  - Identify discharge learning needs (meds, wound care, etc )  - Arrange for interpretive services to assist at discharge as needed  - Refer to Case Management Department for coordinating discharge planning if the patient needs post-hospital services based on physician/advanced practitioner order or complex needs related to functional status, cognitive ability, or social support system  Outcome: Progressing     Problem: Knowledge Deficit  Goal: Patient/family/caregiver demonstrates understanding of disease process, treatment plan, medications, and discharge instructions  Description  Complete learning assessment and assess knowledge base    Interventions:  - Provide teaching at level of understanding  - Provide teaching via preferred learning methods  Outcome: Progressing     Problem: GASTROINTESTINAL - ADULT  Goal: Minimal or absence of nausea and/or vomiting  Description  INTERVENTIONS:  - Administer IV fluids if ordered to ensure adequate hydration  - Maintain NPO status until nausea and vomiting are resolved  - Nasogastric tube if ordered  - Administer ordered antiemetic medications as needed  - Provide nonpharmacologic comfort measures as appropriate  - Advance diet as tolerated, if ordered  - Consider nutrition services referral to assist patient with adequate nutrition and appropriate food choices  Outcome: Progressing  Goal: Maintains or returns to baseline bowel function  Description  INTERVENTIONS:  - Assess bowel function  - Encourage oral fluids to ensure adequate hydration  - Administer IV fluids if ordered to ensure adequate hydration  - Administer ordered medications as needed  - Encourage mobilization and activity  - Consider nutritional services referral to assist patient with adequate nutrition and appropriate food choices  Outcome: Progressing  Goal: Maintains adequate nutritional intake  Description  INTERVENTIONS:  - Monitor percentage of each meal consumed  - Identify factors contributing to decreased intake, treat as appropriate  - Assist with meals as needed  - Monitor I&O, weight, and lab values if indicated  - Obtain nutrition services referral as needed  Outcome: Progressing

## 2019-12-31 NOTE — PLAN OF CARE
Problem: PAIN - ADULT  Goal: Verbalizes/displays adequate comfort level or baseline comfort level  Description  Interventions:  - Encourage patient to monitor pain and request assistance  - Assess pain using appropriate pain scale  - Administer analgesics based on type and severity of pain and evaluate response  - Implement non-pharmacological measures as appropriate and evaluate response  - Consider cultural and social influences on pain and pain management  - Notify physician/advanced practitioner if interventions unsuccessful or patient reports new pain  Outcome: Progressing     Problem: INFECTION - ADULT  Goal: Absence or prevention of progression during hospitalization  Description  INTERVENTIONS:  - Assess and monitor for signs and symptoms of infection  - Monitor lab/diagnostic results  - Monitor all insertion sites, i e  indwelling lines, tubes, and drains  - Monitor endotracheal if appropriate and nasal secretions for changes in amount and color  - [place] appropriate cooling/warming therapies per order  - Administer medications as ordered  - Instruct and encourage patient and family to use good hand hygiene technique  - Identify and instruct in appropriate isolation precautions for identified infection/condition  Outcome: Progressing  Goal: Absence of fever/infection during neutropenic period  Description  INTERVENTIONS:  - Monitor WBC    Outcome: Progressing     Problem: SAFETY ADULT  Goal: Patient will remain free of falls  Description  INTERVENTIONS:  - Assess patient frequently for physical needs  -  Identify cognitive and physical deficits and behaviors that affect risk of falls    -  [place] fall precautions as indicated by assessment   - Educate patient/family on patient safety including physical limitations  - Instruct patient to call for assistance with activity based on assessment  - Modify environment to reduce risk of injury  - Consider OT/PT consult to assist with strengthening/mobility  Outcome: Progressing  Goal: Maintain or return to baseline ADL function  Description  INTERVENTIONS:  -  Assess patient's ability to carry out ADLs; assess patient's baseline for ADL function and identify physical deficits which impact ability to perform ADLs (bathing, care of mouth/teeth, toileting, grooming, dressing, etc )  - Assess/evaluate cause of self-care deficits   - Assess range of motion  - Assess patient's mobility; develop plan if impaired  - Assess patient's need for assistive devices and provide as appropriate  - Encourage maximum independence but intervene and supervise when necessary  - Involve family in performance of ADLs  - Assess for home care needs following discharge   - Consider OT consult to assist with ADL evaluation and planning for discharge  - Provide patient education as appropriate  Outcome: Progressing  Goal: Maintain or return mobility status to optimal level  Description  INTERVENTIONS:  - Assess patient's baseline mobility status (ambulation, transfers, stairs, etc )    - Identify cognitive and physical deficits and behaviors that affect mobility  - Identify mobility aids required to assist with transfers and/or ambulation (gait belt, sit-to-stand, lift, walker, cane, etc )  - [place] fall precautions as indicated by assessment  - Record patient progress and toleration of activity level on Mobility SBAR; progress patient to next Phase/Stage  - Instruct patient to call for assistance with activity based on assessment  - Consider rehabilitation consult to assist with strengthening/weightbearing, etc   Outcome: Progressing     Problem: DISCHARGE PLANNING  Goal: Discharge to home or other facility with appropriate resources  Description  INTERVENTIONS:  - Identify barriers to discharge w/patient and caregiver  - Arrange for needed discharge resources and transportation as appropriate  - Identify discharge learning needs (meds, wound care, etc )  - Arrange for interpretive services to assist at discharge as needed  - Refer to Case Management Department for coordinating discharge planning if the patient needs post-hospital services based on physician/advanced practitioner order or complex needs related to functional status, cognitive ability, or social support system  Outcome: Progressing     Problem: Knowledge Deficit  Goal: Patient/family/caregiver demonstrates understanding of disease process, treatment plan, medications, and discharge instructions  Description  Complete learning assessment and assess knowledge base    Interventions:  - Provide teaching at level of understanding  - Provide teaching via preferred learning methods  Outcome: Progressing     Problem: GASTROINTESTINAL - ADULT  Goal: Minimal or absence of nausea and/or vomiting  Description  INTERVENTIONS:  - Administer IV fluids if ordered to ensure adequate hydration  - Maintain NPO status until nausea and vomiting are resolved  - Nasogastric tube if ordered  - Administer ordered antiemetic medications as needed  - Provide nonpharmacologic comfort measures as appropriate  - Advance diet as tolerated, if ordered  - Consider nutrition services referral to assist patient with adequate nutrition and appropriate food choices  Outcome: Progressing  Goal: Maintains or returns to baseline bowel function  Description  INTERVENTIONS:  - Assess bowel function  - Encourage oral fluids to ensure adequate hydration  - Administer IV fluids if ordered to ensure adequate hydration  - Administer ordered medications as needed  - Encourage mobilization and activity  - Consider nutritional services referral to assist patient with adequate nutrition and appropriate food choices  Outcome: Progressing  Goal: Maintains adequate nutritional intake  Description  INTERVENTIONS:  - Monitor percentage of each meal consumed  - Identify factors contributing to decreased intake, treat as appropriate  - Assist with meals as needed  - Monitor I&O, weight, and lab values if indicated  - Obtain nutrition services referral as needed  Outcome: Progressing

## 2020-01-01 LAB
ALBUMIN SERPL BCP-MCNC: 2.6 G/DL (ref 3.5–5)
ALP SERPL-CCNC: 93 U/L (ref 46–116)
ALT SERPL W P-5'-P-CCNC: 18 U/L (ref 12–78)
ANION GAP SERPL CALCULATED.3IONS-SCNC: 6 MMOL/L (ref 4–13)
AST SERPL W P-5'-P-CCNC: 14 U/L (ref 5–45)
BILIRUB DIRECT SERPL-MCNC: 0.15 MG/DL (ref 0–0.2)
BILIRUB SERPL-MCNC: 0.45 MG/DL (ref 0.2–1)
BUN SERPL-MCNC: 5 MG/DL (ref 5–25)
CALCIUM SERPL-MCNC: 9.5 MG/DL (ref 8.3–10.1)
CHLORIDE SERPL-SCNC: 107 MMOL/L (ref 100–108)
CO2 SERPL-SCNC: 25 MMOL/L (ref 21–32)
CREAT SERPL-MCNC: 0.98 MG/DL (ref 0.6–1.3)
GFR SERPL CREATININE-BSD FRML MDRD: 75 ML/MIN/1.73SQ M
GLUCOSE SERPL-MCNC: 102 MG/DL (ref 65–140)
POTASSIUM SERPL-SCNC: 4.1 MMOL/L (ref 3.5–5.3)
PROT SERPL-MCNC: 6.9 G/DL (ref 6.4–8.2)
SODIUM SERPL-SCNC: 138 MMOL/L (ref 136–145)

## 2020-01-01 PROCEDURE — 88304 TISSUE EXAM BY PATHOLOGIST: CPT | Performed by: PATHOLOGY

## 2020-01-01 PROCEDURE — 47562 LAPAROSCOPIC CHOLECYSTECTOMY: CPT | Performed by: SURGERY

## 2020-01-01 PROCEDURE — 80076 HEPATIC FUNCTION PANEL: CPT | Performed by: SURGERY

## 2020-01-01 PROCEDURE — 0FT44ZZ RESECTION OF GALLBLADDER, PERCUTANEOUS ENDOSCOPIC APPROACH: ICD-10-PCS | Performed by: SURGERY

## 2020-01-01 PROCEDURE — 99232 SBSQ HOSP IP/OBS MODERATE 35: CPT | Performed by: SURGERY

## 2020-01-01 PROCEDURE — 80048 BASIC METABOLIC PNL TOTAL CA: CPT | Performed by: SURGERY

## 2020-01-01 RX ORDER — GLYCOPYRROLATE 0.2 MG/ML
INJECTION INTRAMUSCULAR; INTRAVENOUS AS NEEDED
Status: DISCONTINUED | OUTPATIENT
Start: 2020-01-01 | End: 2020-01-01 | Stop reason: SURG

## 2020-01-01 RX ORDER — ONDANSETRON 2 MG/ML
INJECTION INTRAMUSCULAR; INTRAVENOUS AS NEEDED
Status: DISCONTINUED | OUTPATIENT
Start: 2020-01-01 | End: 2020-01-01 | Stop reason: SURG

## 2020-01-01 RX ORDER — METOPROLOL TARTRATE 5 MG/5ML
INJECTION INTRAVENOUS AS NEEDED
Status: DISCONTINUED | OUTPATIENT
Start: 2020-01-01 | End: 2020-01-01 | Stop reason: SURG

## 2020-01-01 RX ORDER — SODIUM CHLORIDE, SODIUM LACTATE, POTASSIUM CHLORIDE, CALCIUM CHLORIDE 600; 310; 30; 20 MG/100ML; MG/100ML; MG/100ML; MG/100ML
10 INJECTION, SOLUTION INTRAVENOUS CONTINUOUS
Status: DISCONTINUED | OUTPATIENT
Start: 2020-01-01 | End: 2020-01-01

## 2020-01-01 RX ORDER — MAGNESIUM HYDROXIDE 1200 MG/15ML
LIQUID ORAL AS NEEDED
Status: DISCONTINUED | OUTPATIENT
Start: 2020-01-01 | End: 2020-01-01 | Stop reason: HOSPADM

## 2020-01-01 RX ORDER — PROPOFOL 10 MG/ML
INJECTION, EMULSION INTRAVENOUS AS NEEDED
Status: DISCONTINUED | OUTPATIENT
Start: 2020-01-01 | End: 2020-01-01 | Stop reason: SURG

## 2020-01-01 RX ORDER — ROCURONIUM BROMIDE 10 MG/ML
INJECTION, SOLUTION INTRAVENOUS AS NEEDED
Status: DISCONTINUED | OUTPATIENT
Start: 2020-01-01 | End: 2020-01-01 | Stop reason: SURG

## 2020-01-01 RX ORDER — DEXAMETHASONE SODIUM PHOSPHATE 10 MG/ML
INJECTION, SOLUTION INTRAMUSCULAR; INTRAVENOUS AS NEEDED
Status: DISCONTINUED | OUTPATIENT
Start: 2020-01-01 | End: 2020-01-01 | Stop reason: SURG

## 2020-01-01 RX ORDER — HYDROMORPHONE HCL/PF 1 MG/ML
0.4 SYRINGE (ML) INJECTION
Status: DISCONTINUED | OUTPATIENT
Start: 2020-01-01 | End: 2020-01-01 | Stop reason: HOSPADM

## 2020-01-01 RX ORDER — MIDAZOLAM HYDROCHLORIDE 2 MG/2ML
INJECTION, SOLUTION INTRAMUSCULAR; INTRAVENOUS
Status: COMPLETED
Start: 2020-01-01 | End: 2020-01-01

## 2020-01-01 RX ORDER — ONDANSETRON 2 MG/ML
4 INJECTION INTRAMUSCULAR; INTRAVENOUS ONCE AS NEEDED
Status: DISCONTINUED | OUTPATIENT
Start: 2020-01-01 | End: 2020-01-01 | Stop reason: HOSPADM

## 2020-01-01 RX ORDER — FENTANYL CITRATE/PF 50 MCG/ML
25 SYRINGE (ML) INJECTION
Status: DISCONTINUED | OUTPATIENT
Start: 2020-01-01 | End: 2020-01-01 | Stop reason: HOSPADM

## 2020-01-01 RX ORDER — SODIUM CHLORIDE, SODIUM LACTATE, POTASSIUM CHLORIDE, CALCIUM CHLORIDE 600; 310; 30; 20 MG/100ML; MG/100ML; MG/100ML; MG/100ML
INJECTION, SOLUTION INTRAVENOUS CONTINUOUS PRN
Status: DISCONTINUED | OUTPATIENT
Start: 2020-01-01 | End: 2020-01-01

## 2020-01-01 RX ORDER — BUPIVACAINE HYDROCHLORIDE 5 MG/ML
INJECTION, SOLUTION PERINEURAL AS NEEDED
Status: DISCONTINUED | OUTPATIENT
Start: 2020-01-01 | End: 2020-01-01 | Stop reason: HOSPADM

## 2020-01-01 RX ORDER — FENTANYL CITRATE 50 UG/ML
INJECTION, SOLUTION INTRAMUSCULAR; INTRAVENOUS AS NEEDED
Status: DISCONTINUED | OUTPATIENT
Start: 2020-01-01 | End: 2020-01-01 | Stop reason: SURG

## 2020-01-01 RX ORDER — NEOSTIGMINE METHYLSULFATE 1 MG/ML
INJECTION INTRAVENOUS AS NEEDED
Status: DISCONTINUED | OUTPATIENT
Start: 2020-01-01 | End: 2020-01-01 | Stop reason: SURG

## 2020-01-01 RX ORDER — KETOROLAC TROMETHAMINE 30 MG/ML
INJECTION, SOLUTION INTRAMUSCULAR; INTRAVENOUS AS NEEDED
Status: DISCONTINUED | OUTPATIENT
Start: 2020-01-01 | End: 2020-01-01 | Stop reason: SURG

## 2020-01-01 RX ORDER — MIDAZOLAM HYDROCHLORIDE 2 MG/2ML
INJECTION, SOLUTION INTRAMUSCULAR; INTRAVENOUS AS NEEDED
Status: DISCONTINUED | OUTPATIENT
Start: 2020-01-01 | End: 2020-01-01 | Stop reason: SURG

## 2020-01-01 RX ADMIN — GLYCOPYRROLATE 0.5 MG: 0.2 INJECTION, SOLUTION INTRAMUSCULAR; INTRAVENOUS at 12:13

## 2020-01-01 RX ADMIN — HEPARIN SODIUM 5000 UNITS: 5000 INJECTION INTRAVENOUS; SUBCUTANEOUS at 21:10

## 2020-01-01 RX ADMIN — LIDOCAINE HYDROCHLORIDE 50 MG: 20 INJECTION, SOLUTION INTRAVENOUS at 11:04

## 2020-01-01 RX ADMIN — METOPROLOL TARTRATE 2 MG: 5 INJECTION, SOLUTION INTRAVENOUS at 11:54

## 2020-01-01 RX ADMIN — DIVALPROEX SODIUM 500 MG: 125 CAPSULE, COATED PELLETS ORAL at 17:02

## 2020-01-01 RX ADMIN — QUETIAPINE FUMARATE 100 MG: 100 TABLET ORAL at 21:10

## 2020-01-01 RX ADMIN — CEFAZOLIN SODIUM 2000 MG: 2 SOLUTION INTRAVENOUS at 11:10

## 2020-01-01 RX ADMIN — PROPOFOL 100 MG: 10 INJECTION, EMULSION INTRAVENOUS at 11:04

## 2020-01-01 RX ADMIN — PHENYLEPHRINE HYDROCHLORIDE 200 MCG: 10 INJECTION INTRAVENOUS at 11:16

## 2020-01-01 RX ADMIN — SODIUM CHLORIDE, SODIUM LACTATE, POTASSIUM CHLORIDE, AND CALCIUM CHLORIDE: .6; .31; .03; .02 INJECTION, SOLUTION INTRAVENOUS at 10:58

## 2020-01-01 RX ADMIN — KETOROLAC TROMETHAMINE 30 MG: 30 INJECTION, SOLUTION INTRAMUSCULAR at 12:07

## 2020-01-01 RX ADMIN — NEOSTIGMINE METHYLSULFATE 3 MG: 1 INJECTION INTRAVENOUS at 12:13

## 2020-01-01 RX ADMIN — HEPARIN SODIUM 5000 UNITS: 5000 INJECTION INTRAVENOUS; SUBCUTANEOUS at 05:00

## 2020-01-01 RX ADMIN — ROCURONIUM BROMIDE 30 MG: 50 INJECTION, SOLUTION INTRAVENOUS at 11:04

## 2020-01-01 RX ADMIN — METOPROLOL TARTRATE 2 MG: 5 INJECTION, SOLUTION INTRAVENOUS at 11:40

## 2020-01-01 RX ADMIN — ONDANSETRON 4 MG: 2 INJECTION INTRAMUSCULAR; INTRAVENOUS at 12:07

## 2020-01-01 RX ADMIN — FENTANYL CITRATE 50 MCG: 50 INJECTION, SOLUTION INTRAMUSCULAR; INTRAVENOUS at 11:04

## 2020-01-01 RX ADMIN — OXYCODONE HYDROCHLORIDE 5 MG: 5 TABLET ORAL at 02:16

## 2020-01-01 RX ADMIN — FENTANYL CITRATE 25 MCG: 50 INJECTION, SOLUTION INTRAMUSCULAR; INTRAVENOUS at 11:29

## 2020-01-01 RX ADMIN — DEXAMETHASONE SODIUM PHOSPHATE 5 MG: 10 INJECTION, SOLUTION INTRAMUSCULAR; INTRAVENOUS at 11:25

## 2020-01-01 RX ADMIN — MIDAZOLAM 2 MG: 1 INJECTION INTRAMUSCULAR; INTRAVENOUS at 10:55

## 2020-01-01 RX ADMIN — FENTANYL CITRATE 25 MCG: 50 INJECTION, SOLUTION INTRAMUSCULAR; INTRAVENOUS at 11:32

## 2020-01-01 NOTE — PROGRESS NOTES
Progress Note - General Surgery   Dior Beckford 62 y o  female MRN: [id number]  Unit/Bed#: [facility] 830-01 Encounter: [id number]    Assessment:  54F with acute GS pancreatitis, clinically improving, tolerating diet    Plan: To OR for Lap Swati  NPO, diet post op  PRN pain control  Anticipate d/c this PM vs tomorrow 1/2/2020    Subjective/Objective   Chief Complaint:     Subjective: No acute events overnight  Resting in bed comfortably  Objective:     Blood pressure 150/88, pulse 103, temperature 100 1 °F (37 8 °C), resp  rate 16, height 5' 5" (1 651 m), weight 77 1 kg (170 lb), SpO2 98 %, not currently breastfeeding  ,Body mass index is 28 29 kg/m²  Intake/Output Summary (Last 24 hours) at 1/1/2020 1963  Last data filed at 1/1/2020 3893  Gross per 24 hour   Intake 2340 84 ml   Output 3350 ml   Net -1009 16 ml       Invasive Devices     Peripheral Intravenous Line            Peripheral IV 12/28/19 Left Antecubital 3 days                Physical Exam: General: AAOx3  Head: normocephalic, atraumatic  Neck: supple, trachea midline  Respiratory: BS b/l  Abdomen: Soft, non tender, non distended  Heart: RRR, S1s2  Ext: Warm no cyanosis   Pulse: 2+ radial      Lab, Imaging and other studies:I have personally reviewed pertinent lab results    , CBC: No results found for: WBC, HGB, HCT, MCV, PLT, ADJUSTEDWBC, MCH, MCHC, RDW, MPV, NRBC, CMP: No results found for: SODIUM, K, CL, CO2, ANIONGAP, BUN, CREATININE, GLUCOSE, CALCIUM, AST, ALT, ALKPHOS, PROT, BILITOT, EGFR  VTE Pharmacologic Prophylaxis: Heparin  VTE Mechanical Prophylaxis: sequential compression device

## 2020-01-01 NOTE — ANESTHESIA PREPROCEDURE EVALUATION
Review of Systems/Medical History  Patient summary reviewed  Chart reviewed      Cardiovascular  Exercise tolerance (METS): >4,  Hypertension ,    Pulmonary  Negative pulmonary ROS        GI/Hepatic    Pancreatic problem,   Comment: Gallstone pancreatitis     Negative  ROS        Endo/Other  Negative endo/other ROS      GYN       Hematology  Negative hematology ROS      Musculoskeletal  Negative musculoskeletal ROS        Neurology  Negative neurology ROS      Psychology   Depression , being treated for depression,              Physical Exam    Airway    Mallampati score: III  TM Distance: >3 FB  Neck ROM: limited     Dental   No notable dental hx     Cardiovascular  Rhythm: regular, Rate: normal, Cardiovascular exam normal    Pulmonary  Pulmonary exam normal Breath sounds clear to auscultation,     Other Findings        Anesthesia Plan  ASA Score- 2     Anesthesia Type- general with ASA Monitors  Additional Monitors:   Airway Plan: ETT  Plan Factors-Patient not instructed to abstain from smoking on day of procedure       Induction- intravenous  Postoperative Plan- Plan for postoperative opioid use  Informed Consent- Anesthetic plan and risks discussed with patient  I personally reviewed this patient with the CRNA  Discussed and agreed on the Anesthesia Plan with the CRNA  Sisi Stovall Recent labs personally reviewed:  Lab Results   Component Value Date    WBC 7 47 12/31/2019    HGB 11 3 (L) 12/31/2019     (L) 12/31/2019     Lab Results   Component Value Date    K 4 1 01/01/2020    BUN 5 01/01/2020    CREATININE 0 98 01/01/2020     No results found for: PTT   No results found for: INR    Blood type O    No results found for: Christopher Carlisle MD, have personally seen and evaluated the patient prior to anesthetic care  I have reviewed the pre-anesthetic record, and other medical records if appropriate to the anesthetic care    If a CRNA is involved in the case, I have reviewed the CRNA assessment, if present, and agree  Risks/benefits and alternatives discussed with patient including possible PONV, sore throat, and possibility of rare anesthetic and surgical emergencies

## 2020-01-01 NOTE — PLAN OF CARE
Problem: PAIN - ADULT  Goal: Verbalizes/displays adequate comfort level or baseline comfort level  Description  Interventions:  - Encourage patient to monitor pain and request assistance  - Assess pain using appropriate pain scale  - Administer analgesics based on type and severity of pain and evaluate response  - Implement non-pharmacological measures as appropriate and evaluate response  - Consider cultural and social influences on pain and pain management  - Notify physician/advanced practitioner if interventions unsuccessful or patient reports new pain  Outcome: Progressing     Problem: SAFETY ADULT  Goal: Patient will remain free of falls  Description  INTERVENTIONS:  - Assess patient frequently for physical needs  -  Identify cognitive and physical deficits and behaviors that affect risk of falls    -  [place] fall precautions as indicated by assessment   - Educate patient/family on patient safety including physical limitations  - Instruct patient to call for assistance with activity based on assessment  - Modify environment to reduce risk of injury  - Consider OT/PT consult to assist with strengthening/mobility  Outcome: Progressing  Goal: Maintain or return to baseline ADL function  Description  INTERVENTIONS:  -  Assess patient's ability to carry out ADLs; assess patient's baseline for ADL function and identify physical deficits which impact ability to perform ADLs (bathing, care of mouth/teeth, toileting, grooming, dressing, etc )  - Assess/evaluate cause of self-care deficits   - Assess range of motion  - Assess patient's mobility; develop plan if impaired  - Assess patient's need for assistive devices and provide as appropriate  - Encourage maximum independence but intervene and supervise when necessary  - Involve family in performance of ADLs  - Assess for home care needs following discharge   - Consider OT consult to assist with ADL evaluation and planning for discharge  - Provide patient education as appropriate  Outcome: Progressing  Goal: Maintain or return mobility status to optimal level  Description  INTERVENTIONS:  - Assess patient's baseline mobility status (ambulation, transfers, stairs, etc )    - Identify cognitive and physical deficits and behaviors that affect mobility  - Identify mobility aids required to assist with transfers and/or ambulation (gait belt, sit-to-stand, lift, walker, cane, etc )  - [place] fall precautions as indicated by assessment  - Record patient progress and toleration of activity level on Mobility SBAR; progress patient to next Phase/Stage  - Instruct patient to call for assistance with activity based on assessment  - Consider rehabilitation consult to assist with strengthening/weightbearing, etc   Outcome: Progressing     Problem: GASTROINTESTINAL - ADULT  Goal: Minimal or absence of nausea and/or vomiting  Description  INTERVENTIONS:  - Administer IV fluids if ordered to ensure adequate hydration  - Maintain NPO status until nausea and vomiting are resolved  - Nasogastric tube if ordered  - Administer ordered antiemetic medications as needed  - Provide nonpharmacologic comfort measures as appropriate  - Advance diet as tolerated, if ordered  - Consider nutrition services referral to assist patient with adequate nutrition and appropriate food choices  Outcome: Progressing  Goal: Maintains or returns to baseline bowel function  Description  INTERVENTIONS:  - Assess bowel function  - Encourage oral fluids to ensure adequate hydration  - Administer IV fluids if ordered to ensure adequate hydration  - Administer ordered medications as needed  - Encourage mobilization and activity  - Consider nutritional services referral to assist patient with adequate nutrition and appropriate food choices  Outcome: Progressing  Goal: Maintains adequate nutritional intake  Description  INTERVENTIONS:  - Monitor percentage of each meal consumed  - Identify factors contributing to decreased intake, treat as appropriate  - Assist with meals as needed  - Monitor I&O, weight, and lab values if indicated  - Obtain nutrition services referral as needed  Outcome: Progressing     Problem: GENITOURINARY - ADULT  Goal: Maintains or returns to baseline urinary function  Description  INTERVENTIONS:  - Assess urinary function  - Encourage oral fluids to ensure adequate hydration if ordered  - Administer IV fluids as ordered to ensure adequate hydration  - Administer ordered medications as needed  - Offer frequent toileting  - Follow urinary retention protocol if ordered  Outcome: Progressing  Goal: Absence of urinary retention  Description  INTERVENTIONS:  - Assess patients ability to void and empty bladder  - Monitor I/O  - Bladder scan as needed  - Discuss with physician/AP medications to alleviate retention as needed  - Discuss catheterization for long term situations as appropriate  Outcome: Progressing  Goal: Urinary catheter remains patent  Description  INTERVENTIONS:  - Assess patency of urinary catheter  - If patient has a chronic mcgarry, consider changing catheter if non-functioning  - Follow guidelines for intermittent irrigation of non-functioning urinary catheter  Outcome: Progressing

## 2020-01-01 NOTE — ANESTHESIA POSTPROCEDURE EVALUATION
Post-Op Assessment Note    CV Status:  Stable  Pain Score: 0    Pain management: adequate     Mental Status:  Alert and awake   Hydration Status:  Euvolemic   PONV Controlled:  Controlled   Airway Patency:  Patent   Post Op Vitals Reviewed: Yes      Staff: CRNA           BP   149/70   Temp   97 3   Pulse  80   Resp   19   SpO2   100% 6LO2 mask

## 2020-01-02 VITALS
DIASTOLIC BLOOD PRESSURE: 84 MMHG | RESPIRATION RATE: 20 BRPM | HEART RATE: 85 BPM | BODY MASS INDEX: 28.32 KG/M2 | WEIGHT: 170 LBS | SYSTOLIC BLOOD PRESSURE: 140 MMHG | HEIGHT: 65 IN | OXYGEN SATURATION: 100 % | TEMPERATURE: 97.5 F

## 2020-01-02 PROBLEM — K85.10 ACUTE GALLSTONE PANCREATITIS: Status: RESOLVED | Noted: 2019-12-28 | Resolved: 2020-01-02

## 2020-01-02 PROCEDURE — 99238 HOSP IP/OBS DSCHRG MGMT 30/<: CPT | Performed by: SURGERY

## 2020-01-02 PROCEDURE — NC001 PR NO CHARGE: Performed by: SURGERY

## 2020-01-02 RX ADMIN — OXYCODONE HYDROCHLORIDE 5 MG: 5 TABLET ORAL at 12:10

## 2020-01-02 RX ADMIN — DIVALPROEX SODIUM 1000 MG: 125 CAPSULE, COATED PELLETS ORAL at 00:23

## 2020-01-02 RX ADMIN — DIVALPROEX SODIUM 500 MG: 125 CAPSULE, COATED PELLETS ORAL at 09:34

## 2020-01-02 RX ADMIN — HEPARIN SODIUM 5000 UNITS: 5000 INJECTION INTRAVENOUS; SUBCUTANEOUS at 05:50

## 2020-01-02 NOTE — DISCHARGE INSTRUCTIONS
Laparoscopic Cholecystectomy   WHAT YOU NEED TO KNOW:   Take tylenol for pain  OK to shower  Follow up with our office in 2 weeks  Laparoscopic cholecystectomy is surgery to remove your gallbladder  DISCHARGE INSTRUCTIONS:   Medicines: You may need any of the following:  · Prescription pain medicine  helps decrease pain  Do not wait until the pain is severe before you take this medicine  · NSAIDs  decrease swelling and pain  This medicine can be bought with or without a doctor's order  This medicine can cause stomach bleeding or kidney problems in certain people  If you take blood thinner medicine, always ask your healthcare provider if NSAIDs are safe for you  Read the medicine label and follow the directions on it before using this medicine  · Take your medicine as directed  Contact your healthcare provider if you think your medicine is not helping or if you have side effects  Tell him or her if you are allergic to any medicine  Keep a list of the medicines, vitamins, and herbs you take  Include the amounts, and when and why you take them  Bring the list or the pill bottles to follow-up visits  Carry your medicine list with you in case of an emergency  Follow up with your healthcare provider 2 weeks after surgery, or as directed:  Write down your questions so you remember to ask them during your visits  Wound care:  Care for your surgical wounds as directed  Keep the wounds clean and dry  You may take a shower the day after your surgery  What to eat after surgery:  Eat low-fat foods for 4 to 6 weeks while your body learns to digest fat without a gallbladder  Slowly increase the amount of fat that you eat  Drink plenty of liquids  Ask how much liquid to drink and which liquids are best for you  When to return to work and other activities: You may return to work or other activities as soon as your pain is controlled and you feel comfortable   For many people, this is 5 to 7 days after surgery  Contact your healthcare provider if:   · You have a fever over 101°F (38°C) or chills  · You have pain or nausea that is not relieved by medicine  · You have redness and swelling around your incisions, or blood or pus is leaking from your incisions  · You are constipated or have diarrhea  · Your skin or eyes are yellow, or your bowel movements are pale  · You have questions or concerns about your surgery, condition, or care  Seek care immediately or call 911 if:   · You cannot stop vomiting  · Your bowel movements are black or bloody  · You have pain in your abdomen and it is swollen or hard  · Your arm or leg feels warm, tender, and painful  It may look swollen and red  · You feel lightheaded, short of breath, and have chest pain  · You cough up blood  © 2017 [address] Information is for End User's use only and may not be sold, redistributed or otherwise used for commercial purposes  All illustrations and images included in CareNotes® are the copyrighted property of A [employer]  or [employer]  The above information is an  only  It is not intended as medical advice for individual conditions or treatments  Talk to your doctor, nurse or pharmacist before following any medical regimen to see if it is safe and effective for you  Pancreatitis   WHAT YOU NEED TO KNOW:   Pancreatitis is inflammation of your pancreas  The pancreas is an organ that makes insulin  It also makes enzymes (digestive juices) that help your body digest food  Pancreatitis may be an acute (short-term) problem that happens only once  It may become a chronic (long-term) problem that comes and goes over time  DISCHARGE INSTRUCTIONS:   Return to the emergency department if:   · You have severe pain in your abdomen and you are vomiting  Contact your healthcare provider if:   · You have a fever  · You continue to lose weight      · Your skin or the whites of your eyes turn yellow  · You have questions or concerns about your condition or care  Medicines: You may need any of the following:  · Antibiotics  treat a bacterial infection  · Prescription pain medicine  may be given  Ask your healthcare provider how to take this medicine safely  Some prescription pain medicines contain acetaminophen  Do not take other medicines that contain acetaminophen without talking to your healthcare provider  Too much acetaminophen may cause liver damage  Prescription pain medicine may cause constipation  Ask your healthcare provider how to prevent or treat constipation  · Take your medicine as directed  Contact your healthcare provider if you think your medicine is not helping or if you have side effects  Tell him or her if you are allergic to any medicine  Keep a list of the medicines, vitamins, and herbs you take  Include the amounts, and when and why you take them  Bring the list or the pill bottles to follow-up visits  Carry your medicine list with you in case of an emergency  Self-care:   · Rest  when you feel it is needed  Slowly start to do more each day  Return to your usual activities as directed  · Do not drink any alcohol  If you need help to stop drinking, contact the following organization:   ¨ Alcoholics Anonymous  Web Address: http://www coffey [employer]/      · Ask your healthcare provider or dietitian about the best foods to eat  You may need to eat foods that are low in fat if you have chronic pancreatitis  Follow up with your healthcare provider as directed:  Write down your questions so you remember to ask them during your visits  © 2017 [address] Information is for End User's use only and may not be sold, redistributed or otherwise used for commercial purposes  All illustrations and images included in CareNotes® are the copyrighted property of [employer]  or [employer]    The above information is an  only  It is not intended as medical advice for individual conditions or treatments  Talk to your doctor, nurse or pharmacist before following any medical regimen to see if it is safe and effective for you

## 2020-01-02 NOTE — DISCHARGE SUMMARY
Discharge Summary - Adin Hill 62 y o  female MRN: [id number]    [facility] Encounter: [id number]    Admission Date:   Admission Orders (From admission, onward)     Ordered        12/28/19 2245  Inpatient Admission  Once                     Admitting Diagnosis: Abdominal pain [R10 9]    HPI:  Patient is a 49-year-old female past medical history of diabetes, gallstones, schizophrenia complicated with multiple psychiatric admissions, who presented with 1 day of acute abdominal pain  Patient was with a significant leukocytosis [place] of 98712, and an elevated lipase of 2300  Patient's bilirubin and liver function tests were all within normal limits  Right upper quadrant ultrasound on admission revealed peripancreatic fluid, was significant for cholelithiasis and a diet dilated common bile duct of 11 mm  Patient's chief complaint was sharp right upper quadrant pain  Patient endorsed history of biliary colic for the past 5 years consistent with right upper quadrant pain after eating fatty foods  Denied any past surgical history, on arrival denied any fevers, chills, nausea, vomiting, or diarrhea  Procedures Performed: No orders of the defined types were placed in this encounter  Summary of Hospital Course:  Patient was admitted to the acute care surgery service on 12/28/2019 for the working diagnosis of gallstone pancreatitis  On admission patient had right upper quadrant ultrasound which was consistent with peripancreatic fluid, multiple stones, common bile duct dilated to 11 mm  Patient had MRCP performed on 12/31 which was consistent with cholelithiasis without findings of acute cholecystitis, central intrahepatic and common bile duct dilation of 1 3 cm was observed  There was no choledocholithiasis, biliary stricture or suspicious mass on this imaging  Patient was taken to the OR on 01/01 2020 for laparoscopic cholecystectomy, with Dr Gage    Procedure was performed without complications and the patient was taken to PACU in stable condition, and then transferred to the medical surgery le[facility]va where she recovered  Patient's vital signs were continually stable, her pain was well controlled, she ambulated, was tolerating a regular diet, and producing adequate urine  On postop day 1 patient was ambulating, her vital signs were continually stable, and she was afebrile, tolerating regular diet, passing flatus, and her abdominal pain had resolved  Patient was deemed medically stable for discharge on 01/02/2020  Significant Findings, Care, Treatment and Services Provided: none    Complications: none    Discharge Diagnosis: same    Resolved Problems  Date Reviewed: 1/2/2020          Resolved    * (Principal) Acute gallstone pancreatitis 1/2/2020     Resolved by  Neta Canavan, MD          Condition at Discharge: good         Discharge instructions/Information to patient and family:   See after visit summary for information provided to patient and family  Provisions for Follow-Up Care:  See after visit summary for information related to follow-up care and any pertinent home health orders  PCP: No primary care provider on file  Disposition: See After Visit Summary for discharge disposition information  Planned Readmission: No      Discharge Statement   I spent 30 minutes discharging the patient  This time was spent on the day of discharge  I had direct contact with the patient on the day of discharge  Additional documentation is required if more than 30 minutes were spent on discharge  Discharge Medications:  See after visit summary for reconciled discharge medications provided to patient and family

## 2020-01-03 NOTE — OP NOTE
OPERATIVE REPORT  PATIENT NAME: Kacie Kebede    :  1965  MRN: [id number]  Pt Location:  OR ROOM 07    SURGERY DATE: 2020    Surgeon(s) and Role:     * Srinivas Austin DO - Primary     * Myles De Anda MD - Assisting    Preop Diagnosis:  Acute gallstone pancreatitis [K85 10]    Post-Op Diagnosis Codes:     * Acute gallstone pancreatitis [K85 10]    Procedure(s) (LRB):  Laparoscopic Cholecystectomy (N/A)    Specimen(s):  ID Type Source Tests Collected by Time Destination   1 : Gallbladder Tissue Gallbladder TISSUE EXAM Srinivas Austin DO 2020 1203        Estimated Blood Loss:   Minimal    Drains:  [REMOVED] NG/OG/Enteral Tube Orogastric 18 Fr Center mouth (Removed)   Number of days: 0       Anesthesia Type:   General    Operative Indications:  Acute gallstone pancreatitis [K85 10]      Operative Findings:  Gallbladder with large stone    Complications:   None    Procedure and Technique:    Planned procedure discussed with patient in preoperative area and consent obtained  The patient was taken to the OR and intubated on the OR table  She was then prepped and draped in usual fashion  A timeout was performed verifying correct patient, site, and procedure  An infraumbilical curvilinear incision was then made and taken down to the level of the fascia  The fascia was grasped and opened  An Portillo cannula was placed into the abdomen, the abdomen was insufflated, and it was confirmed there was no injury  5mm trochars were placed in an epigastric position and two subcostal positions on right lateral side  These were all placed under direct visualization after local infiltration       The gallbladder was hen grasped with blunt graspers  The cystic duct and artery were clearly isolated and identified as entering the gallbladder  The cystic duct was clipped and transected close to the gallbladder   Next, the cystic artery was clearly isolated and identified as entering the gallbladder and three clips were placed on the cystic artery  The cystic artery was transected between the two clips closer to the gallbladder  Next, using hook electrocautery, the peritoneum over the gallbladder body was stripped on the lateral and medial sides of the gallbladder  Finally, the gallbladder was excised from the liver bed using electrocautery  There was no bile spillage  Once excised, the gallbladder was placed in an endo-catch bag and removed in tact through the umbilical port site  The gallbladder fossa was hemostatic  The trochars were removed and pneumoperitoneum was reduced  The umbilical port site was closed with 0 vicryl suture figure-of-eight stitch x2  Each skin incision was closed with subcuticular 4-0 monocryl and Exofin  The patient was then extubated and moved to PACU in stable condition    I was present for the entire procedure      Patient Disposition:  PACU     SIGNATURE: Jadiel Domingo DO  DATE: January 3, 2020  TIME: 9:08 AM

## 2020-05-11 ENCOUNTER — TELEPHONE (OUTPATIENT)
Dept: DERMATOLOGY | Facility: CLINIC | Age: 55
End: 2020-05-11

## (undated) DEVICE — 3000CC GUARDIAN II: Brand: GUARDIAN

## (undated) DEVICE — SUT VICRYL 0 UR-6 27 IN J603H

## (undated) DEVICE — INTENDED FOR TISSUE SEPARATION, AND OTHER PROCEDURES THAT REQUIRE A SHARP SURGICAL BLADE TO PUNCTURE OR CUT.: Brand: BARD-PARKER SAFETY BLADES SIZE 11, STERILE

## (undated) DEVICE — PENCIL ELECTROSURG E-Z CLEAN -0035H

## (undated) DEVICE — TISSUE RETRIEVAL SYSTEM: Brand: INZII RETRIEVAL SYSTEM

## (undated) DEVICE — NEEDLE 25G X 1 1/2

## (undated) DEVICE — TROCAR: Brand: KII FIOS FIRST ENTRY

## (undated) DEVICE — LIGAMAX 5 MM ENDOSCOPIC MULTIPLE CLIP APPLIER: Brand: LIGAMAX

## (undated) DEVICE — GLOVE INDICATOR PI UNDERGLOVE SZ 7.5 BLUE

## (undated) DEVICE — ENDOPATH 5MM CURVED SCISSORS WITH MONOPOLAR CAUTERY: Brand: ENDOPATH

## (undated) DEVICE — TROCAR: Brand: KII® SLEEVE

## (undated) DEVICE — ADHESIVE SKIN HIGH VISCOSITY EXOFIN 1ML

## (undated) DEVICE — CHLORAPREP HI-LITE 26ML ORANGE

## (undated) DEVICE — PACK PBDS LAP CHOLE RF

## (undated) DEVICE — TROCARS: Brand: KII® BALLOON BLUNT TIP SYSTEM

## (undated) DEVICE — GLOVE SRG BIOGEL 7

## (undated) DEVICE — SUT MONOCRYL 4-0 PS-2 18 IN Y496G

## (undated) DEVICE — ELECTRODE LAP J HOOK E-Z CLEAN 33CM-0021